# Patient Record
Sex: MALE | Race: WHITE | NOT HISPANIC OR LATINO | Employment: FULL TIME | URBAN - METROPOLITAN AREA
[De-identification: names, ages, dates, MRNs, and addresses within clinical notes are randomized per-mention and may not be internally consistent; named-entity substitution may affect disease eponyms.]

---

## 2017-06-23 ENCOUNTER — APPOINTMENT (OUTPATIENT)
Dept: ULTRASOUND IMAGING | Facility: HOSPITAL | Age: 42
End: 2017-06-23

## 2017-06-23 ENCOUNTER — HOSPITAL ENCOUNTER (EMERGENCY)
Facility: HOSPITAL | Age: 42
Discharge: HOME | End: 2017-06-23
Attending: EMERGENCY MEDICINE | Admitting: EMERGENCY MEDICINE

## 2017-06-23 VITALS
HEIGHT: 72 IN | WEIGHT: 245 LBS | OXYGEN SATURATION: 98 % | HEART RATE: 65 BPM | RESPIRATION RATE: 16 BRPM | SYSTOLIC BLOOD PRESSURE: 160 MMHG | DIASTOLIC BLOOD PRESSURE: 100 MMHG | TEMPERATURE: 96.5 F | BODY MASS INDEX: 33.18 KG/M2

## 2017-06-23 DIAGNOSIS — K80.20 CALCULUS OF GALLBLADDER WITHOUT CHOLECYSTITIS WITHOUT OBSTRUCTION: Primary | ICD-10-CM

## 2017-06-23 LAB
ALBUMIN SERPL-MCNC: 4.3 G/DL (ref 3.5–5.2)
ALBUMIN/GLOB SERPL: 1.4 G/DL
ALP SERPL-CCNC: 65 U/L (ref 39–117)
ALT SERPL W P-5'-P-CCNC: 38 U/L (ref 1–41)
ANION GAP SERPL CALCULATED.3IONS-SCNC: 14.1 MMOL/L
AST SERPL-CCNC: 43 U/L (ref 1–40)
BACTERIA UR QL AUTO: ABNORMAL /HPF
BASOPHILS # BLD AUTO: 0.02 10*3/MM3 (ref 0–0.2)
BASOPHILS NFR BLD AUTO: 0.2 % (ref 0–1.5)
BILIRUB SERPL-MCNC: 0.5 MG/DL (ref 0.1–1.2)
BILIRUB UR QL STRIP: NEGATIVE
BUN BLD-MCNC: 13 MG/DL (ref 6–20)
BUN/CREAT SERPL: 14.4 (ref 7–25)
CALCIUM SPEC-SCNC: 9.3 MG/DL (ref 8.6–10.5)
CHLORIDE SERPL-SCNC: 105 MMOL/L (ref 98–107)
CLARITY UR: CLEAR
CO2 SERPL-SCNC: 22.9 MMOL/L (ref 22–29)
COLOR UR: YELLOW
CREAT BLD-MCNC: 0.9 MG/DL (ref 0.76–1.27)
DEPRECATED RDW RBC AUTO: 41.7 FL (ref 37–54)
EOSINOPHIL # BLD AUTO: 0.13 10*3/MM3 (ref 0–0.7)
EOSINOPHIL NFR BLD AUTO: 1.1 % (ref 0.3–6.2)
ERYTHROCYTE [DISTWIDTH] IN BLOOD BY AUTOMATED COUNT: 13.6 % (ref 11.5–14.5)
GFR SERPL CREATININE-BSD FRML MDRD: 93 ML/MIN/1.73
GLOBULIN UR ELPH-MCNC: 3 GM/DL
GLUCOSE BLD-MCNC: 119 MG/DL (ref 65–99)
GLUCOSE UR STRIP-MCNC: NEGATIVE MG/DL
HCT VFR BLD AUTO: 45 % (ref 40.4–52.2)
HGB BLD-MCNC: 15.8 G/DL (ref 13.7–17.6)
HGB UR QL STRIP.AUTO: ABNORMAL
HYALINE CASTS UR QL AUTO: ABNORMAL /LPF
IMM GRANULOCYTES # BLD: 0.02 10*3/MM3 (ref 0–0.03)
IMM GRANULOCYTES NFR BLD: 0.2 % (ref 0–0.5)
KETONES UR QL STRIP: NEGATIVE
LEUKOCYTE ESTERASE UR QL STRIP.AUTO: NEGATIVE
LIPASE SERPL-CCNC: 27 U/L (ref 13–60)
LYMPHOCYTES # BLD AUTO: 4.32 10*3/MM3 (ref 0.9–4.8)
LYMPHOCYTES NFR BLD AUTO: 37.7 % (ref 19.6–45.3)
MCH RBC QN AUTO: 29.8 PG (ref 27–32.7)
MCHC RBC AUTO-ENTMCNC: 35.1 G/DL (ref 32.6–36.4)
MCV RBC AUTO: 84.9 FL (ref 79.8–96.2)
MONOCYTES # BLD AUTO: 0.62 10*3/MM3 (ref 0.2–1.2)
MONOCYTES NFR BLD AUTO: 5.4 % (ref 5–12)
NEUTROPHILS # BLD AUTO: 6.34 10*3/MM3 (ref 1.9–8.1)
NEUTROPHILS NFR BLD AUTO: 55.4 % (ref 42.7–76)
NITRITE UR QL STRIP: NEGATIVE
PH UR STRIP.AUTO: 6 [PH] (ref 5–8)
PLATELET # BLD AUTO: 187 10*3/MM3 (ref 140–500)
PMV BLD AUTO: 10.6 FL (ref 6–12)
POTASSIUM BLD-SCNC: 3.6 MMOL/L (ref 3.5–5.2)
PROT SERPL-MCNC: 7.3 G/DL (ref 6–8.5)
PROT UR QL STRIP: NEGATIVE
RBC # BLD AUTO: 5.3 10*6/MM3 (ref 4.6–6)
RBC # UR: ABNORMAL /HPF
REF LAB TEST METHOD: ABNORMAL
SODIUM BLD-SCNC: 142 MMOL/L (ref 136–145)
SP GR UR STRIP: 1.02 (ref 1–1.03)
SQUAMOUS #/AREA URNS HPF: ABNORMAL /HPF
UROBILINOGEN UR QL STRIP: ABNORMAL
WBC NRBC COR # BLD: 11.45 10*3/MM3 (ref 4.5–10.7)
WBC UR QL AUTO: ABNORMAL /HPF

## 2017-06-23 PROCEDURE — 99284 EMERGENCY DEPT VISIT MOD MDM: CPT

## 2017-06-23 PROCEDURE — 80053 COMPREHEN METABOLIC PANEL: CPT | Performed by: EMERGENCY MEDICINE

## 2017-06-23 PROCEDURE — 96375 TX/PRO/DX INJ NEW DRUG ADDON: CPT

## 2017-06-23 PROCEDURE — 25010000002 ONDANSETRON PER 1 MG: Performed by: EMERGENCY MEDICINE

## 2017-06-23 PROCEDURE — 96374 THER/PROPH/DIAG INJ IV PUSH: CPT

## 2017-06-23 PROCEDURE — 85025 COMPLETE CBC W/AUTO DIFF WBC: CPT | Performed by: EMERGENCY MEDICINE

## 2017-06-23 PROCEDURE — 81001 URINALYSIS AUTO W/SCOPE: CPT | Performed by: EMERGENCY MEDICINE

## 2017-06-23 PROCEDURE — 76705 ECHO EXAM OF ABDOMEN: CPT

## 2017-06-23 PROCEDURE — 83690 ASSAY OF LIPASE: CPT | Performed by: EMERGENCY MEDICINE

## 2017-06-23 PROCEDURE — 96361 HYDRATE IV INFUSION ADD-ON: CPT

## 2017-06-23 PROCEDURE — 25010000002 MORPHINE PER 10 MG: Performed by: EMERGENCY MEDICINE

## 2017-06-23 RX ORDER — ONDANSETRON 2 MG/ML
4 INJECTION INTRAMUSCULAR; INTRAVENOUS ONCE
Status: COMPLETED | OUTPATIENT
Start: 2017-06-23 | End: 2017-06-23

## 2017-06-23 RX ORDER — ONDANSETRON 8 MG/1
8 TABLET, ORALLY DISINTEGRATING ORAL EVERY 8 HOURS PRN
Qty: 12 TABLET | Refills: 0 | Status: SHIPPED | OUTPATIENT
Start: 2017-06-23 | End: 2018-02-15

## 2017-06-23 RX ORDER — SODIUM CHLORIDE 0.9 % (FLUSH) 0.9 %
10 SYRINGE (ML) INJECTION AS NEEDED
Status: DISCONTINUED | OUTPATIENT
Start: 2017-06-23 | End: 2017-06-23 | Stop reason: HOSPADM

## 2017-06-23 RX ORDER — HYDROCODONE BITARTRATE AND ACETAMINOPHEN 5; 325 MG/1; MG/1
1 TABLET ORAL EVERY 6 HOURS PRN
Qty: 20 TABLET | Refills: 0 | Status: SHIPPED | OUTPATIENT
Start: 2017-06-23 | End: 2018-02-15

## 2017-06-23 RX ORDER — PANTOPRAZOLE SODIUM 40 MG/10ML
80 INJECTION, POWDER, LYOPHILIZED, FOR SOLUTION INTRAVENOUS ONCE
Status: COMPLETED | OUTPATIENT
Start: 2017-06-23 | End: 2017-06-23

## 2017-06-23 RX ADMIN — ONDANSETRON 4 MG: 2 INJECTION INTRAMUSCULAR; INTRAVENOUS at 03:25

## 2017-06-23 RX ADMIN — MORPHINE SULFATE 4 MG: 4 INJECTION, SOLUTION INTRAMUSCULAR; INTRAVENOUS at 03:25

## 2017-06-23 RX ADMIN — SODIUM CHLORIDE 1000 ML: 9 INJECTION, SOLUTION INTRAVENOUS at 03:25

## 2017-06-23 RX ADMIN — PANTOPRAZOLE SODIUM 80 MG: 40 INJECTION, POWDER, FOR SOLUTION INTRAVENOUS at 03:25

## 2017-06-23 NOTE — ED NOTES
C/o upper abd pain off and on since Sunday.  Pain severe x 1 hour.  + nausea     Alanis Angel RN  06/23/17 2087

## 2017-06-23 NOTE — ED NOTES
Pt says that when he eats the pain worsens. Pt complains of midline upper abd pain. Pt denies N/V       Chavez De La Cruz RN  06/23/17 9711

## 2017-06-23 NOTE — ED PROVIDER NOTES
EMERGENCY DEPARTMENT ENCOUNTER    CHIEF COMPLAINT  Chief Complaint: abd pain  History given by: pt  History limited by: none   Room Number: 18/18  PMD: Burt Carrero MD      HPI:  Pt is a 42 y.o. male who presents complaining of waxing and waning mid abd pain that the pt states radiates to his chest and neck for the past 4 days. Pt states that his pain is worse after eating. Pt states that he has taken proton inhibitors sporadically without relief. Pt also c/o nausea. Pt denies vomiting. Pt denies a Hx abd surgeries.     Duration:  4 days   Onset: gradual  Timing: waxing and waning   Location: mid abd   Radiation: chest and neck  Quality: pain  Intensity/Severity: moderate   Progression: waxing and waning   Associated Symptoms: nausea  Aggravating Factors: eating  Alleviating Factors: none stated   Previous Episodes: Hx GERD and abd pain  Treatment before arrival: single dose of proton inhibitors    PAST MEDICAL HISTORY  Active Ambulatory Problems     Diagnosis Date Noted   • Gastroesophageal reflux disease 10/24/2016   • Seasonal allergic rhinitis 10/24/2016   • Obesity (BMI 30-39.9) 10/24/2016   • Dysphagia 10/24/2016     Resolved Ambulatory Problems     Diagnosis Date Noted   • No Resolved Ambulatory Problems     Past Medical History:   Diagnosis Date   • GERD (gastroesophageal reflux disease)        PAST SURGICAL HISTORY  Past Surgical History:   Procedure Laterality Date   • KNEE ACL RECONSTRUCTION Right 2010   • KNEE ACL RECONSTRUCTION         FAMILY HISTORY  Family History   Problem Relation Age of Onset   • Lung cancer Father 69   • Hypertension Mother    • Diabetes Paternal Grandmother        SOCIAL HISTORY  Social History     Social History   • Marital status:      Spouse name: Miriam   • Number of children: 3   • Years of education: N/A     Occupational History   • 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    REVIEW OF SYSTEMS  Review of Systems   Constitutional: Negative for activity change, appetite change and fever.   HENT: Negative for congestion and sore throat.    Eyes: Negative.    Respiratory: Negative for cough and shortness of breath.    Cardiovascular: Negative for chest pain and leg swelling.   Gastrointestinal: Positive for abdominal pain (radiating to chest and neck) and nausea. Negative for diarrhea and vomiting.   Endocrine: Negative.    Genitourinary: Negative for decreased urine volume and dysuria.   Musculoskeletal: Negative for neck pain.   Skin: Negative for rash and wound.   Allergic/Immunologic: Negative.    Neurological: Negative for weakness, numbness and headaches.   Hematological: Negative.    Psychiatric/Behavioral: Negative.    All other systems reviewed and are negative.      PHYSICAL EXAM  ED Triage Vitals   Temp Heart Rate Resp BP SpO2   06/23/17 0254 06/23/17 0254 06/23/17 0254 -- 06/23/17 0254   96.5 °F (35.8 °C) 96 16  97 %      Temp src Heart Rate Source Patient Position BP Location FiO2 (%)   -- -- -- -- --              Physical Exam   Constitutional: He is oriented to person, place, and time and well-developed, well-nourished, and in no distress.   HENT:   Head: Normocephalic and atraumatic.   Eyes: EOM are normal. Pupils are equal, round, and reactive to light.   Neck: Normal range of motion. Neck supple.   Cardiovascular: Normal rate, regular rhythm and normal heart sounds.    Pulmonary/Chest: Effort normal and breath sounds normal. No respiratory distress.   Abdominal: Soft. There is tenderness in the epigastric area. There is no rebound and no guarding.   Musculoskeletal: Normal range of motion. He exhibits no edema.   Neurological: He is alert and  oriented to person, place, and time. He has normal sensation and normal strength.   Skin: Skin is warm and dry.   Psychiatric: Mood and affect normal.   Nursing note and vitals reviewed.      LAB RESULTS  Lab Results (last 24 hours)     Procedure Component Value Units Date/Time    Urinalysis With / Culture If Indicated [[id number]]  (Abnormal) Collected:  06/23/17 0308    Specimen:  Urine from Urine, Clean Catch Updated:  06/23/17 0329     Color, UA Yellow     Appearance, UA Clear     pH, UA 6.0     Specific Gravity, UA 1.023     Glucose, UA Negative     Ketones, UA Negative     Bilirubin, UA Negative     Blood, UA Small (1+) (A)     Protein, UA Negative     Leuk Esterase, UA Negative     Nitrite, UA Negative     Urobilinogen, UA 1.0 E.U./dL    Urinalysis, Microscopic Only [[id number]]  (Abnormal) Collected:  06/23/17 0308    Specimen:  Urine from Urine, Clean Catch Updated:  06/23/17 0329     RBC, UA 3-5 (A) /HPF      WBC, UA 3-5 (A) /HPF      Bacteria, UA None Seen /HPF      Squamous Epithelial Cells, UA 0-2 /HPF      Hyaline Casts, UA 3-6 /LPF      Methodology Automated Microscopy    CBC & Differential [[id number]] Collected:  06/23/17 0313    Specimen:  Blood Updated:  06/23/17 0323    Narrative:       The following orders were created for panel order CBC & Differential.  Procedure                               Abnormality         Status                     ---------                               -----------         ------                     CBC Auto Differential[[id number]]         Abnormal            Final result                 Please view results for these tests on the individual orders.    Comprehensive Metabolic Panel [[id number]]  (Abnormal) Collected:  06/23/17 0313    Specimen:  Blood Updated:  06/23/17 0346     Glucose 119 (H) mg/dL      BUN 13 mg/dL      Creatinine 0.90 mg/dL      Sodium 142 mmol/L      Potassium 3.6 mmol/L      Chloride 105 mmol/L      CO2 22.9 mmol/L      Calcium 9.3 mg/dL      Total Protein  7.3 g/dL      Albumin 4.30 g/dL      ALT (SGPT) 38 U/L      AST (SGOT) 43 (H) U/L      Alkaline Phosphatase 65 U/L      Total Bilirubin 0.5 mg/dL      eGFR Non African Amer 93 mL/min/1.73      Globulin 3.0 gm/dL      A/G Ratio 1.4 g/dL      BUN/Creatinine Ratio 14.4     Anion Gap 14.1 mmol/L     Lipase [[id number]]  (Normal) Collected:  06/23/17 0313    Specimen:  Blood Updated:  06/23/17 0346     Lipase 27 U/L     CBC Auto Differential [[id number]]  (Abnormal) Collected:  06/23/17 0313    Specimen:  Blood Updated:  06/23/17 0323     WBC 11.45 (H) 10*3/mm3      RBC 5.30 10*6/mm3      Hemoglobin 15.8 g/dL      Hematocrit 45.0 %      MCV 84.9 fL      MCH 29.8 pg      MCHC 35.1 g/dL      RDW 13.6 %      RDW-SD 41.7 fl      MPV 10.6 fL      Platelets 187 10*3/mm3      Neutrophil % 55.4 %      Lymphocyte % 37.7 %      Monocyte % 5.4 %      Eosinophil % 1.1 %      Basophil % 0.2 %      Immature Grans % 0.2 %      Neutrophils, Absolute 6.34 10*3/mm3      Lymphocytes, Absolute 4.32 10*3/mm3      Monocytes, Absolute 0.62 10*3/mm3      Eosinophils, Absolute 0.13 10*3/mm3      Basophils, Absolute 0.02 10*3/mm3      Immature Grans, Absolute 0.02 10*3/mm3           I ordered the above labs and reviewed the results    RADIOLOGY  US Gallbladder   Final Result   1. Cholelithiasis.           This report was finalized on 6/23/2017 3:51 AM by Shahzad Soto MD.           US gallbladder: multiple gall stone w/o pericholecystic fluid or gallbladder wall thickening    I ordered the above noted radiological studies. Interpreted by radiologist. Reviewed by me in PACS.       PROCEDURES  Procedures      PROGRESS AND CONSULTS  ED Course   0255  Ordered labs for further evaluation.   0313  Ordered morphine, zofran, and protonix for pain and nausea, and a US gallbladder for further evaluation.   0344  Received a call from the US tech, who described the pt's US gallbladder with findings of multiple gall stone w/o pericholecystic fluid or gallbladder  wall thickening   0414  Rechecked pt, who is resting comfortably, in no distress, and without focal neurologic deficit. Discussed findings of gallstones, and plan to d/c the pt with meds for pain and nausea, advisement to avoid fatty foods, and a referral to a surgeon for gallbladder removal. Pt understands and agrees with the plan, and all questions were answered.       MEDICAL DECISION MAKING  Results were reviewed/discussed with the patient and they were also made aware of online access. Pt also made aware that some labs, such as cultures, will not be resulted during ER visit and follow up with PMD is necessary.     MDM  Number of Diagnoses or Management Options     Amount and/or Complexity of Data Reviewed  Clinical lab tests: ordered and reviewed (WBC 11.45, Blood U 1+)  Tests in the radiology section of CPT®: ordered and reviewed (US gallbladder: multiple gall stone w/o pericholecystic fluid or gallbladder wall thickening)    Patient Progress  Patient progress: stable         DIAGNOSIS  Final diagnoses:   Calculus of gallbladder without cholecystitis without obstruction       DISPOSITION  DISCHARGE    Patient discharged in stable condition.    Reviewed implications of results, diagnosis, meds, responsibility to follow up, warning signs and symptoms of possible worsening, potential complications and reasons to return to ER.    Patient/Family voiced understanding of above instructions.    Discussed plan for discharge, as there is no emergent indication for admission.  Pt/family is agreeable and understands need for follow up and repeat testing.  Pt is aware that discharge does not mean that nothing is wrong but it indicates no emergency is present that requires admission and they must continue care with follow-up as given below or physician of their choice.     FOLLOW-UP  Burt Carrero MD  [address] [address]  [phone]    Schedule an appointment as soon as possible for a  visit      Mayur Nettles MD  [address]  [address] [address]  [phone]    Schedule an appointment as soon as possible for a visit           Medication List      New Prescriptions          HYDROcodone-acetaminophen 5-325 MG per tablet   Commonly known as:  NORCO   Take 1 tablet by mouth Every 6 (Six) Hours As Needed for Moderate Pain   (4-6).       ondansetron ODT 8 MG disintegrating tablet   Commonly known as:  ZOFRAN ODT   Take 1 tablet by mouth Every 8 (Eight) Hours As Needed for Nausea or   Vomiting.         Stop          fexofenadine 30 MG tablet   Commonly known as:  ALLEGRA               Latest Documented Vital Signs:  As of 6:56 AM  BP- 160/100 HR- 65 Temp- 96.5 °F (35.8 °C) O2 sat- 98%    --  Documentation assistance provided by balwinder Hinkle for Dr. Bowman.  Information recorded by the miguel angelibsarath was done at my direction and has been verified and validated by me.     Elio Hinkle  06/23/17 0508       Bashir Bowman MD  06/23/17 0639

## 2017-06-26 ENCOUNTER — TELEPHONE (OUTPATIENT)
Dept: SOCIAL WORK | Facility: HOSPITAL | Age: 42
End: 2017-06-26

## 2018-02-15 ENCOUNTER — OFFICE VISIT (OUTPATIENT)
Dept: INTERNAL MEDICINE | Facility: CLINIC | Age: 43
End: 2018-02-15

## 2018-02-15 VITALS
HEIGHT: 72 IN | TEMPERATURE: 97 F | DIASTOLIC BLOOD PRESSURE: 82 MMHG | HEART RATE: 65 BPM | SYSTOLIC BLOOD PRESSURE: 116 MMHG | OXYGEN SATURATION: 94 % | WEIGHT: 263.5 LBS | BODY MASS INDEX: 35.69 KG/M2

## 2018-02-15 DIAGNOSIS — R13.19 ESOPHAGEAL DYSPHAGIA: Primary | ICD-10-CM

## 2018-02-15 DIAGNOSIS — E66.9 OBESITY (BMI 30-39.9): Chronic | ICD-10-CM

## 2018-02-15 DIAGNOSIS — K21.9 GASTROESOPHAGEAL REFLUX DISEASE, ESOPHAGITIS PRESENCE NOT SPECIFIED: Chronic | ICD-10-CM

## 2018-02-15 DIAGNOSIS — H02.9 EYELID LESION: ICD-10-CM

## 2018-02-15 DIAGNOSIS — R31.21 ASYMPTOMATIC MICROSCOPIC HEMATURIA: ICD-10-CM

## 2018-02-15 DIAGNOSIS — R73.9 HYPERGLYCEMIA: ICD-10-CM

## 2018-02-15 DIAGNOSIS — K80.20 CALCULUS OF GALLBLADDER WITHOUT CHOLECYSTITIS WITHOUT OBSTRUCTION: ICD-10-CM

## 2018-02-15 LAB
ALBUMIN SERPL-MCNC: 4.1 G/DL (ref 3.5–5.2)
ALBUMIN/GLOB SERPL: 1.7 G/DL
ALP SERPL-CCNC: 60 U/L (ref 39–117)
ALT SERPL-CCNC: 34 U/L (ref 1–41)
AST SERPL-CCNC: 23 U/L (ref 1–40)
BASOPHILS # BLD AUTO: 0.02 10*3/MM3 (ref 0–0.2)
BASOPHILS NFR BLD AUTO: 0.3 % (ref 0–1.5)
BILIRUB BLD-MCNC: NEGATIVE MG/DL
BILIRUB SERPL-MCNC: 0.3 MG/DL (ref 0.1–1.2)
BUN SERPL-MCNC: 15 MG/DL (ref 6–20)
BUN/CREAT SERPL: 16.5 (ref 7–25)
CALCIUM SERPL-MCNC: 9.4 MG/DL (ref 8.6–10.5)
CHLORIDE SERPL-SCNC: 104 MMOL/L (ref 98–107)
CLARITY, POC: CLEAR
CO2 SERPL-SCNC: 25.5 MMOL/L (ref 22–29)
COLOR UR: YELLOW
CREAT SERPL-MCNC: 0.91 MG/DL (ref 0.76–1.27)
EOSINOPHIL # BLD AUTO: 0.16 10*3/MM3 (ref 0–0.7)
EOSINOPHIL NFR BLD AUTO: 2 % (ref 0.3–6.2)
ERYTHROCYTE [DISTWIDTH] IN BLOOD BY AUTOMATED COUNT: 13.8 % (ref 11.5–14.5)
GFR SERPLBLD CREATININE-BSD FMLA CKD-EPI: 111 ML/MIN/1.73
GFR SERPLBLD CREATININE-BSD FMLA CKD-EPI: 92 ML/MIN/1.73
GLOBULIN SER CALC-MCNC: 2.4 GM/DL
GLUCOSE SERPL-MCNC: 94 MG/DL (ref 65–99)
GLUCOSE UR STRIP-MCNC: NEGATIVE MG/DL
HBA1C MFR BLD: 5.51 % (ref 4.8–5.6)
HCT VFR BLD AUTO: 43.9 % (ref 40.4–52.2)
HGB BLD-MCNC: 14.7 G/DL (ref 13.7–17.6)
IMM GRANULOCYTES # BLD: 0 10*3/MM3 (ref 0–0.03)
IMM GRANULOCYTES NFR BLD: 0 % (ref 0–0.5)
KETONES UR QL: NEGATIVE
LEUKOCYTE EST, POC: NEGATIVE
LYMPHOCYTES # BLD AUTO: 3 10*3/MM3 (ref 0.9–4.8)
LYMPHOCYTES NFR BLD AUTO: 37.9 % (ref 19.6–45.3)
MCH RBC QN AUTO: 28.8 PG (ref 27–32.7)
MCHC RBC AUTO-ENTMCNC: 33.5 G/DL (ref 32.6–36.4)
MCV RBC AUTO: 85.9 FL (ref 79.8–96.2)
MONOCYTES # BLD AUTO: 0.44 10*3/MM3 (ref 0.2–1.2)
MONOCYTES NFR BLD AUTO: 5.6 % (ref 5–12)
NEUTROPHILS # BLD AUTO: 4.29 10*3/MM3 (ref 1.9–8.1)
NEUTROPHILS NFR BLD AUTO: 54.2 % (ref 42.7–76)
NITRITE UR-MCNC: NEGATIVE MG/ML
PH UR: 6 [PH] (ref 5–8)
PLATELET # BLD AUTO: 192 10*3/MM3 (ref 140–500)
POTASSIUM SERPL-SCNC: 3.8 MMOL/L (ref 3.5–5.2)
PROT SERPL-MCNC: 6.5 G/DL (ref 6–8.5)
PROT UR STRIP-MCNC: NEGATIVE MG/DL
RBC # BLD AUTO: 5.11 10*6/MM3 (ref 4.6–6)
RBC # UR STRIP: ABNORMAL /UL
SODIUM SERPL-SCNC: 141 MMOL/L (ref 136–145)
SP GR UR: 1.03 (ref 1–1.03)
UROBILINOGEN UR QL: NORMAL
WBC # BLD AUTO: 7.91 10*3/MM3 (ref 4.5–10.7)

## 2018-02-15 PROCEDURE — 99215 OFFICE O/P EST HI 40 MIN: CPT | Performed by: FAMILY MEDICINE

## 2018-02-15 PROCEDURE — 81003 URINALYSIS AUTO W/O SCOPE: CPT | Performed by: FAMILY MEDICINE

## 2018-02-15 RX ORDER — FAMOTIDINE 40 MG/1
40 TABLET, FILM COATED ORAL DAILY
Qty: 30 TABLET | Refills: 3 | Status: SHIPPED | OUTPATIENT
Start: 2018-02-15 | End: 2018-08-09

## 2018-02-15 NOTE — PROGRESS NOTES
Lester Benoit is a 42 y.o. male.      Assessment/Plan   Problem List Items Addressed This Visit        Digestive    Gastroesophageal reflux disease (Chronic)    Relevant Medications    famotidine (PEPCID) 40 MG tablet    Other Relevant Orders    Comprehensive Metabolic Panel    Ambulatory Referral to Gastroenterology    Obesity (BMI 30-39.9) (Chronic)    Relevant Orders    Comprehensive Metabolic Panel    Dysphagia - Primary    Relevant Orders    CBC & Differential    Comprehensive Metabolic Panel    Ambulatory Referral to Gastroenterology    Calculus of gallbladder without biliary obstruction       Genitourinary    Asymptomatic microscopic hematuria    Relevant Orders    POC Urinalysis Dipstick, Automated       Other    Eyelid lesion    Relevant Orders    Ambulatory Referral to General Surgery    Hyperglycemia    Relevant Orders    Hemoglobin A1c         patient will initiate famotidine for his reflux symptoms and then subsequently have GI consultation for reflux as well as dysphagia he's been seen by ophthalmology for eyelid lesion that seems to be growing he's been trying to control his weight with calorie restriction diet.  Will follow-up results of blood work  Repeat microscopic hematuria after adequate hydration and recheck UA  40 minutes spent with patient with greater than 50% of the time face-to-face coordinating care and counseling chronic problems    Chief Complaint   Patient presents with   • transferring from Dr. Carrero   • occasional abdominal pain   • issue with skin close to right tear duct     Social History   Substance Use Topics   • Smoking status: Never Smoker   • Smokeless tobacco: Never Used   • Alcohol use Yes      Comment: socially       History of Present Illness   Patient new to this office with recently developed medical problems of cholelithiasis GERD dysphasia and eyelid lesion and elevated sugars he's concerned because the eyelid lesion has been gradually growing and seems to obstruct his  "vision is slightly painful he feels that there is more tears, now that I because the location of the lesion near tear duct.  He's been found to have cholelithiasis on ultrasound of his gallbladder last summer he is slightly elevated liver enzymes and has intermittent abdominal pain he also has had increasing episodes of difficulty swallowing feels that it is in the middle of his chest it is gradually becoming more frequent he's been using some anti-reflux medicine intermittently with success minutes.  Weight is no  vomiting or constipation or diarrhea.    The following portions of the patient's history were reviewed and updated as appropriate:PMHroutine: Social history , Past Medical History, Surgical history , Allergies, Current Medications, Active Problem List, Family History and Health Maintenance    Review of Systems   Constitutional: Negative for appetite change, fever and unexpected weight change.   HENT: Negative for ear pain, facial swelling and sore throat.    Eyes: Negative for pain and visual disturbance.   Respiratory: Negative for chest tightness, shortness of breath and wheezing.    Cardiovascular: Negative for chest pain and palpitations.   Gastrointestinal: Positive for abdominal pain. Negative for blood in stool, constipation, diarrhea, nausea and vomiting.   Endocrine: Negative.    Genitourinary: Negative for difficulty urinating and hematuria.   Musculoskeletal: Negative for joint swelling.   Neurological: Negative for tremors, seizures and syncope.   Hematological: Negative for adenopathy.   Psychiatric/Behavioral: Negative.        Objective   Vitals:    02/15/18 0920   BP: 116/82   BP Location: Left arm   Patient Position: Sitting   Cuff Size: Large Adult   Pulse: 65   Temp: 97 °F (36.1 °C)   TempSrc: Oral   SpO2: 94%   Weight: 120 kg (263 lb 8 oz)   Height: 182.9 cm (72\")     Body mass index is 35.74 kg/(m^2).  Physical Exam   Constitutional: He is oriented to person, place, and time. He " appears well-developed and well-nourished. No distress.   HENT:   Head: Normocephalic and atraumatic.   Eyes: Conjunctivae and EOM are normal. Pupils are equal, round, and reactive to light. Right eye exhibits no discharge. Left eye exhibits no discharge. No scleral icterus.   Neck: Normal range of motion. Neck supple. No tracheal deviation present. No thyromegaly present.   Cardiovascular: Normal rate, regular rhythm, normal heart sounds, intact distal pulses and normal pulses.  Exam reveals no gallop.    No murmur heard.  Pulmonary/Chest: Effort normal and breath sounds normal. No respiratory distress. He has no wheezes. He has no rales.   Abdominal: Soft. Bowel sounds are normal. He exhibits no distension. There is no tenderness.   Musculoskeletal: Normal range of motion.   Neurological: He is alert and oriented to person, place, and time. He exhibits normal muscle tone. Coordination normal.   Skin: Skin is warm. No rash noted. No erythema. No pallor.   Psychiatric: He has a normal mood and affect. His behavior is normal. Judgment and thought content normal.   Nursing note and vitals reviewed.    Reviewed Data:  No visits with results within 1 Month(s) from this visit.  Latest known visit with results is:    Admission on 06/23/2017, Discharged on 06/23/2017   Component Date Value Ref Range Status   • Glucose 06/23/2017 119* 65 - 99 mg/dL Final   • BUN 06/23/2017 13  6 - 20 mg/dL Final   • Creatinine 06/23/2017 0.90  0.76 - 1.27 mg/dL Final   • Sodium 06/23/2017 142  136 - 145 mmol/L Final   • Potassium 06/23/2017 3.6  3.5 - 5.2 mmol/L Final   • Chloride 06/23/2017 105  98 - 107 mmol/L Final   • CO2 06/23/2017 22.9  22.0 - 29.0 mmol/L Final   • Calcium 06/23/2017 9.3  8.6 - 10.5 mg/dL Final   • Total Protein 06/23/2017 7.3  6.0 - 8.5 g/dL Final   • Albumin 06/23/2017 4.30  3.50 - 5.20 g/dL Final   • ALT (SGPT) 06/23/2017 38  1 - 41 U/L Final   • AST (SGOT) 06/23/2017 43* 1 - 40 U/L Final   • Alkaline Phosphatase  06/23/2017 65  39 - 117 U/L Final   • Total Bilirubin 06/23/2017 0.5  0.1 - 1.2 mg/dL Final   • eGFR Non  Amer 06/23/2017 93  >60 mL/min/1.73 Final   • Globulin 06/23/2017 3.0  gm/dL Final   • A/G Ratio 06/23/2017 1.4  g/dL Final   • BUN/Creatinine Ratio 06/23/2017 14.4  7.0 - 25.0 Final   • Anion Gap 06/23/2017 14.1  mmol/L Final   • Color, UA 06/23/2017 Yellow  Yellow, Straw Final   • Appearance, UA 06/23/2017 Clear  Clear Final   • pH, UA 06/23/2017 6.0  5.0 - 8.0 Final   • Specific Gravity, UA 06/23/2017 1.023  1.005 - 1.030 Final   • Glucose, UA 06/23/2017 Negative  Negative Final   • Ketones, UA 06/23/2017 Negative  Negative Final   • Bilirubin, UA 06/23/2017 Negative  Negative Final   • Blood, UA 06/23/2017 Small (1+)* Negative Final   • Protein, UA 06/23/2017 Negative  Negative Final   • Leuk Esterase, UA 06/23/2017 Negative  Negative Final   • Nitrite, UA 06/23/2017 Negative  Negative Final   • Urobilinogen, UA 06/23/2017 1.0 E.U./dL  0.2 - 1.0 E.U./dL Final   • Lipase 06/23/2017 27  13 - 60 U/L Final   • WBC 06/23/2017 11.45* 4.50 - 10.70 10*3/mm3 Final   • RBC 06/23/2017 5.30  4.60 - 6.00 10*6/mm3 Final   • Hemoglobin 06/23/2017 15.8  13.7 - 17.6 g/dL Final   • Hematocrit 06/23/2017 45.0  40.4 - 52.2 % Final   • MCV 06/23/2017 84.9  79.8 - 96.2 fL Final   • MCH 06/23/2017 29.8  27.0 - 32.7 pg Final   • MCHC 06/23/2017 35.1  32.6 - 36.4 g/dL Final   • RDW 06/23/2017 13.6  11.5 - 14.5 % Final   • RDW-SD 06/23/2017 41.7  37.0 - 54.0 fl Final   • MPV 06/23/2017 10.6  6.0 - 12.0 fL Final   • Platelets 06/23/2017 187  140 - 500 10*3/mm3 Final   • Neutrophil % 06/23/2017 55.4  42.7 - 76.0 % Final   • Lymphocyte % 06/23/2017 37.7  19.6 - 45.3 % Final   • Monocyte % 06/23/2017 5.4  5.0 - 12.0 % Final   • Eosinophil % 06/23/2017 1.1  0.3 - 6.2 % Final   • Basophil % 06/23/2017 0.2  0.0 - 1.5 % Final   • Immature Grans % 06/23/2017 0.2  0.0 - 0.5 % Final   • Neutrophils, Absolute 06/23/2017 6.34  1.90 - 8.10  10*3/mm3 Final   • Lymphocytes, Absolute 06/23/2017 4.32  0.90 - 4.80 10*3/mm3 Final   • Monocytes, Absolute 06/23/2017 0.62  0.20 - 1.20 10*3/mm3 Final   • Eosinophils, Absolute 06/23/2017 0.13  0.00 - 0.70 10*3/mm3 Final   • Basophils, Absolute 06/23/2017 0.02  0.00 - 0.20 10*3/mm3 Final   • Immature Grans, Absolute 06/23/2017 0.02  0.00 - 0.03 10*3/mm3 Final   • RBC, UA 06/23/2017 3-5* None Seen, 0-2 /HPF Final   • WBC, UA 06/23/2017 3-5* None Seen, 0-2 /HPF Final   • Bacteria, UA 06/23/2017 None Seen  None Seen /HPF Final   • Squamous Epithelial Cells, UA 06/23/2017 0-2  None Seen, 0-2 /HPF Final   • Hyaline Casts, UA 06/23/2017 3-6  None Seen /LPF Final   • Methodology 06/23/2017 Automated Microscopy   Final

## 2018-02-16 DIAGNOSIS — R31.21 ASYMPTOMATIC MICROSCOPIC HEMATURIA: Primary | ICD-10-CM

## 2018-02-17 LAB
BACTERIA UR CULT: NO GROWTH
BACTERIA UR CULT: NORMAL

## 2018-02-19 ENCOUNTER — TELEPHONE (OUTPATIENT)
Dept: INTERNAL MEDICINE | Facility: CLINIC | Age: 43
End: 2018-02-19

## 2018-02-19 DIAGNOSIS — R31.21 ASYMPTOMATIC MICROSCOPIC HEMATURIA: Primary | ICD-10-CM

## 2018-03-01 ENCOUNTER — OFFICE VISIT (OUTPATIENT)
Dept: GASTROENTEROLOGY | Facility: CLINIC | Age: 43
End: 2018-03-01

## 2018-03-01 VITALS
WEIGHT: 263.4 LBS | DIASTOLIC BLOOD PRESSURE: 76 MMHG | BODY MASS INDEX: 34.91 KG/M2 | HEIGHT: 73 IN | SYSTOLIC BLOOD PRESSURE: 122 MMHG | TEMPERATURE: 97.8 F

## 2018-03-01 DIAGNOSIS — R13.14 PHARYNGOESOPHAGEAL DYSPHAGIA: ICD-10-CM

## 2018-03-01 DIAGNOSIS — K21.9 GASTROESOPHAGEAL REFLUX DISEASE, ESOPHAGITIS PRESENCE NOT SPECIFIED: Primary | ICD-10-CM

## 2018-03-01 PROCEDURE — 99204 OFFICE O/P NEW MOD 45 MIN: CPT | Performed by: INTERNAL MEDICINE

## 2018-03-01 RX ORDER — SODIUM CHLORIDE, SODIUM LACTATE, POTASSIUM CHLORIDE, CALCIUM CHLORIDE 600; 310; 30; 20 MG/100ML; MG/100ML; MG/100ML; MG/100ML
30 INJECTION, SOLUTION INTRAVENOUS CONTINUOUS
Status: CANCELLED | OUTPATIENT
Start: 2018-04-06

## 2018-03-01 RX ORDER — PANTOPRAZOLE SODIUM 40 MG/1
40 TABLET, DELAYED RELEASE ORAL
Qty: 30 TABLET | Refills: 2 | Status: SHIPPED | OUTPATIENT
Start: 2018-03-01 | End: 2018-05-31 | Stop reason: SDUPTHER

## 2018-03-01 NOTE — PROGRESS NOTES
Chief Complaint   Patient presents with   • Heartburn   • Difficulty Swallowing       Subjective     HPI    Lester Benoit is a 42 y.o. male with a past medical history noted below who presents for evaluation of difficulty swallowing and GERD.  Symptoms have been intermittent for the past year.  Possible precipitant of weight gain.  Occurring with all foods but more noticeable with sandwiches and dry breads.  Foods hang up in the upper esophagus.  Will sit for a few seconds.  It causes some distress as he feels that he is choking.  Finally passes down.  He does try to chew breads thoroughly before trying to swallow.  No associated nausea, vomiting, or weight loss.      Does have lots of heartburn issues for the past year.  Feels substernal burning and reflux.  Worse with tomatoes, spicy food.     Sinuses with significant drainage, takes allegra.  Currently worse with nighttime drainage.    No family history of GI malignancies.  No smoking or excess ETOH.  No abdominal surgeries.  Works in IT at [employer].    Taking pepcid once per day but not noticing much improvement.      Past Medical History:   Diagnosis Date   • GERD (gastroesophageal reflux disease)          Current Outpatient Prescriptions:   •  famotidine (PEPCID) 40 MG tablet, Take 1 tablet by mouth Daily., Disp: 30 tablet, Rfl: 3  •  fexofenadine (ALLEGRA) 30 MG tablet, Take 30 mg by mouth Daily., Disp: , Rfl:   •  pantoprazole (PROTONIX) 40 MG EC tablet, Take 1 tablet by mouth Every Morning Before Breakfast., Disp: 30 tablet, Rfl: 2    No Known Allergies    Social History     Social History   • Marital status:      Spouse name: Miriam   • Number of children: 3   • Years of education: N/A     Occupational History   • [employer] ([employer])      Social History Main Topics   • Smoking status: Never Smoker   • Smokeless tobacco: Never Used   • Alcohol use Yes      Comment: socially   • Drug use: No   • Sexual activity: Yes     Other Topics Concern   • Not on file      Social History Narrative       Family History   Problem Relation Age of Onset   • Lung cancer Father 69   • Hypertension Mother    • Diabetes Paternal Grandmother        Review of Systems   Constitutional: Negative for activity change, appetite change and fatigue.   HENT: Positive for trouble swallowing. Negative for sore throat.    Respiratory: Negative.    Cardiovascular: Negative.    Gastrointestinal: Negative for abdominal distention, abdominal pain and blood in stool.        + GERD   Endocrine: Negative for cold intolerance and heat intolerance.   Genitourinary: Negative for difficulty urinating, dysuria and frequency.   Musculoskeletal: Negative for arthralgias, back pain and myalgias.   Skin: Negative.    Hematological: Negative for adenopathy. Does not bruise/bleed easily.   All other systems reviewed and are negative.      Objective     Vitals:    03/01/18 0858   BP: 122/76   Temp: 97.8 °F (36.6 °C)     Last 2 weights    03/01/18  0858   Weight: 119 kg (263 lb 6.4 oz)     Body mass index is 34.75 kg/(m^2).    Physical Exam   Constitutional: He is oriented to person, place, and time. He appears well-developed and well-nourished. No distress.   obese   HENT:   Head: Normocephalic and atraumatic.   Right Ear: External ear normal.   Left Ear: External ear normal.   Nose: Nose normal.   Mouth/Throat: Oropharynx is clear and moist.   Eyes: Conjunctivae and EOM are normal. Right eye exhibits no discharge. Left eye exhibits no discharge. No scleral icterus.   Neck: Normal range of motion. Neck supple. No thyromegaly present.   No supraclavicular adenopathy   Cardiovascular: Normal rate, regular rhythm, normal heart sounds and intact distal pulses.  Exam reveals no gallop.    No murmur heard.  No lower extremity edema   Pulmonary/Chest: Effort normal and breath sounds normal. No respiratory distress. He has no wheezes.   Abdominal: Soft. Normal appearance and bowel sounds are normal. He exhibits no distension  and no mass. There is no hepatosplenomegaly. There is no tenderness. There is no rigidity, no rebound and no guarding. No hernia.   Genitourinary:   Genitourinary Comments: Rectal exam deferred   Musculoskeletal: Normal range of motion. He exhibits no edema or tenderness.   No atrophy of upper or lower extremities.  Normal digits and nails of both hands.   Lymphadenopathy:     He has no cervical adenopathy.   Neurological: He is alert and oriented to person, place, and time. He displays no atrophy. Coordination normal.   Skin: Skin is warm and dry. No rash noted. He is not diaphoretic. No erythema.   Psychiatric: He has a normal mood and affect. His behavior is normal. Judgment and thought content normal.   Vitals reviewed.      WBC   Date Value Ref Range Status   02/15/2018 7.91 4.50 - 10.70 10*3/mm3 Final   06/23/2017 11.45 (H) 4.50 - 10.70 10*3/mm3 Final     RBC   Date Value Ref Range Status   02/15/2018 5.11 4.60 - 6.00 10*6/mm3 Final   06/23/2017 5.30 4.60 - 6.00 10*6/mm3 Final     Hemoglobin   Date Value Ref Range Status   02/15/2018 14.7 13.7 - 17.6 g/dL Final   06/23/2017 15.8 13.7 - 17.6 g/dL Final     Hematocrit   Date Value Ref Range Status   02/15/2018 43.9 40.4 - 52.2 % Final   06/23/2017 45.0 40.4 - 52.2 % Final     MCV   Date Value Ref Range Status   02/15/2018 85.9 79.8 - 96.2 fL Final   06/23/2017 84.9 79.8 - 96.2 fL Final     MCH   Date Value Ref Range Status   02/15/2018 28.8 27.0 - 32.7 pg Final   06/23/2017 29.8 27.0 - 32.7 pg Final     MCHC   Date Value Ref Range Status   02/15/2018 33.5 32.6 - 36.4 g/dL Final   06/23/2017 35.1 32.6 - 36.4 g/dL Final     RDW   Date Value Ref Range Status   02/15/2018 13.8 11.5 - 14.5 % Final   06/23/2017 13.6 11.5 - 14.5 % Final     RDW-SD   Date Value Ref Range Status   06/23/2017 41.7 37.0 - 54.0 fl Final     MPV   Date Value Ref Range Status   06/23/2017 10.6 6.0 - 12.0 fL Final     Platelets   Date Value Ref Range Status   02/15/2018 192 140 - 500 10*3/mm3  Final   06/23/2017 187 140 - 500 10*3/mm3 Final     Neutrophil %   Date Value Ref Range Status   06/23/2017 55.4 42.7 - 76.0 % Final     Neutrophil Rel %   Date Value Ref Range Status   02/15/2018 54.2 42.7 - 76.0 % Final     Lymphocyte %   Date Value Ref Range Status   06/23/2017 37.7 19.6 - 45.3 % Final     Lymphocyte Rel %   Date Value Ref Range Status   02/15/2018 37.9 19.6 - 45.3 % Final     Monocyte %   Date Value Ref Range Status   06/23/2017 5.4 5.0 - 12.0 % Final     Monocyte Rel %   Date Value Ref Range Status   02/15/2018 5.6 5.0 - 12.0 % Final     Eosinophil %   Date Value Ref Range Status   06/23/2017 1.1 0.3 - 6.2 % Final     Eosinophil Rel %   Date Value Ref Range Status   02/15/2018 2.0 0.3 - 6.2 % Final     Basophil %   Date Value Ref Range Status   06/23/2017 0.2 0.0 - 1.5 % Final     Basophil Rel %   Date Value Ref Range Status   02/15/2018 0.3 0.0 - 1.5 % Final     Immature Grans %   Date Value Ref Range Status   06/23/2017 0.2 0.0 - 0.5 % Final     Neutrophils, Absolute   Date Value Ref Range Status   06/23/2017 6.34 1.90 - 8.10 10*3/mm3 Final     Neutrophils Absolute   Date Value Ref Range Status   02/15/2018 4.29 1.90 - 8.10 10*3/mm3 Final     Lymphocytes, Absolute   Date Value Ref Range Status   06/23/2017 4.32 0.90 - 4.80 10*3/mm3 Final     Lymphocytes Absolute   Date Value Ref Range Status   02/15/2018 3.00 0.90 - 4.80 10*3/mm3 Final     Monocytes, Absolute   Date Value Ref Range Status   06/23/2017 0.62 0.20 - 1.20 10*3/mm3 Final     Monocytes Absolute   Date Value Ref Range Status   02/15/2018 0.44 0.20 - 1.20 10*3/mm3 Final     Eosinophils, Absolute   Date Value Ref Range Status   06/23/2017 0.13 0.00 - 0.70 10*3/mm3 Final     Eosinophils Absolute   Date Value Ref Range Status   02/15/2018 0.16 0.00 - 0.70 10*3/mm3 Final     Basophils, Absolute   Date Value Ref Range Status   06/23/2017 0.02 0.00 - 0.20 10*3/mm3 Final     Basophils Absolute   Date Value Ref Range Status   02/15/2018 0.02  0.00 - 0.20 10*3/mm3 Final     Immature Grans, Absolute   Date Value Ref Range Status   06/23/2017 0.02 0.00 - 0.03 10*3/mm3 Final       Glucose   Date Value Ref Range Status   06/23/2017 119 (H) 65 - 99 mg/dL Final     Sodium   Date Value Ref Range Status   02/15/2018 141 136 - 145 mmol/L Final   06/23/2017 142 136 - 145 mmol/L Final     Potassium   Date Value Ref Range Status   02/15/2018 3.8 3.5 - 5.2 mmol/L Final   06/23/2017 3.6 3.5 - 5.2 mmol/L Final     CO2   Date Value Ref Range Status   06/23/2017 22.9 22.0 - 29.0 mmol/L Final     Total CO2   Date Value Ref Range Status   02/15/2018 25.5 22.0 - 29.0 mmol/L Final     Chloride   Date Value Ref Range Status   02/15/2018 104 98 - 107 mmol/L Final   06/23/2017 105 98 - 107 mmol/L Final     Anion Gap   Date Value Ref Range Status   06/23/2017 14.1 mmol/L Final     Creatinine   Date Value Ref Range Status   02/15/2018 0.91 0.76 - 1.27 mg/dL Final   06/23/2017 0.90 0.76 - 1.27 mg/dL Final     BUN   Date Value Ref Range Status   02/15/2018 15 6 - 20 mg/dL Final   06/23/2017 13 6 - 20 mg/dL Final     BUN/Creatinine Ratio   Date Value Ref Range Status   02/15/2018 16.5 7.0 - 25.0 Final   06/23/2017 14.4 7.0 - 25.0 Final     Calcium   Date Value Ref Range Status   02/15/2018 9.4 8.6 - 10.5 mg/dL Final   06/23/2017 9.3 8.6 - 10.5 mg/dL Final     eGFR Non  Amer   Date Value Ref Range Status   06/23/2017 93 >60 mL/min/1.73 Final     eGFR Non  Am   Date Value Ref Range Status   02/15/2018 92 >60 mL/min/1.73 Final     Alkaline Phosphatase   Date Value Ref Range Status   02/15/2018 60 39 - 117 U/L Final   06/23/2017 65 39 - 117 U/L Final     Total Protein   Date Value Ref Range Status   06/23/2017 7.3 6.0 - 8.5 g/dL Final     ALT (SGPT)   Date Value Ref Range Status   02/15/2018 34 1 - 41 U/L Final   06/23/2017 38 1 - 41 U/L Final     AST (SGOT)   Date Value Ref Range Status   02/15/2018 23 1 - 40 U/L Final   06/23/2017 43 (H) 1 - 40 U/L Final     Total  Bilirubin   Date Value Ref Range Status   02/15/2018 0.3 0.1 - 1.2 mg/dL Final   06/23/2017 0.5 0.1 - 1.2 mg/dL Final     Albumin   Date Value Ref Range Status   02/15/2018 4.10 3.50 - 5.20 g/dL Final   06/23/2017 4.30 3.50 - 5.20 g/dL Final     Globulin   Date Value Ref Range Status   06/23/2017 3.0 gm/dL Final     A/G Ratio   Date Value Ref Range Status   02/15/2018 1.7 g/dL Final   06/23/2017 1.4 g/dL Final         Imaging Results (last 7 days)     ** No results found for the last 168 hours. **            No notes on file    Assessment/Plan    1. Dysphagia: ? eoe vs related to sinuses and drainage    2. GERD: on pepcid without relief.  ? Worsened by weight gain over the past year    Plan  -start daily pepcid  -EGD for further evaluation  -advised to ER for any food sticking > 1 hour    Lester was seen today for heartburn and difficulty swallowing.    Diagnoses and all orders for this visit:    Gastroesophageal reflux disease, esophagitis presence not specified  -     Case Request; Standing  -     Implement Anesthesia Orders Day of Procedure; Standing  -     Obtain Informed Consent; Standing  -     lactated ringers infusion; Infuse 30 mL/hr into a venous catheter Continuous.  -     Case Request  -     pantoprazole (PROTONIX) 40 MG EC tablet; Take 1 tablet by mouth Every Morning Before Breakfast.    Pharyngoesophageal dysphagia  -     Case Request; Standing  -     Implement Anesthesia Orders Day of Procedure; Standing  -     Obtain Informed Consent; Standing  -     lactated ringers infusion; Infuse 30 mL/hr into a venous catheter Continuous.  -     Case Request  -     pantoprazole (PROTONIX) 40 MG EC tablet; Take 1 tablet by mouth Every Morning Before Breakfast.        I have discussed the above plan with the patient.  They verbalize understanding and are in agreement with the plan.  They have been advised to contact the office for any questions, concerns, or changes related to their health.    Dictated utilizing  Vitor dictation

## 2018-03-01 NOTE — PATIENT INSTRUCTIONS
Schedule the EGD    Start the pantoprazole every morning before breakfast    For any additional questions, concerns or changes to your condition after today's office visit please contact the office at [phone].

## 2018-04-06 ENCOUNTER — ANESTHESIA (OUTPATIENT)
Dept: GASTROENTEROLOGY | Facility: HOSPITAL | Age: 43
End: 2018-04-06

## 2018-04-06 ENCOUNTER — HOSPITAL ENCOUNTER (OUTPATIENT)
Facility: HOSPITAL | Age: 43
Setting detail: HOSPITAL OUTPATIENT SURGERY
Discharge: HOME | End: 2018-04-06
Attending: INTERNAL MEDICINE | Admitting: INTERNAL MEDICINE

## 2018-04-06 ENCOUNTER — ANESTHESIA EVENT (OUTPATIENT)
Dept: GASTROENTEROLOGY | Facility: HOSPITAL | Age: 43
End: 2018-04-06

## 2018-04-06 VITALS
RESPIRATION RATE: 16 BRPM | SYSTOLIC BLOOD PRESSURE: 94 MMHG | HEIGHT: 73 IN | BODY MASS INDEX: 34.99 KG/M2 | OXYGEN SATURATION: 95 % | DIASTOLIC BLOOD PRESSURE: 62 MMHG | TEMPERATURE: 97.8 F | WEIGHT: 264 LBS | HEART RATE: 61 BPM

## 2018-04-06 DIAGNOSIS — R13.14 PHARYNGOESOPHAGEAL DYSPHAGIA: ICD-10-CM

## 2018-04-06 DIAGNOSIS — K21.9 GASTROESOPHAGEAL REFLUX DISEASE, ESOPHAGITIS PRESENCE NOT SPECIFIED: ICD-10-CM

## 2018-04-06 PROCEDURE — 88305 TISSUE EXAM BY PATHOLOGIST: CPT | Performed by: INTERNAL MEDICINE

## 2018-04-06 PROCEDURE — 43239 EGD BIOPSY SINGLE/MULTIPLE: CPT | Performed by: INTERNAL MEDICINE

## 2018-04-06 PROCEDURE — 43450 DILATE ESOPHAGUS 1/MULT PASS: CPT | Performed by: INTERNAL MEDICINE

## 2018-04-06 PROCEDURE — S0260 H&P FOR SURGERY: HCPCS | Performed by: INTERNAL MEDICINE

## 2018-04-06 PROCEDURE — 88312 SPECIAL STAINS GROUP 1: CPT | Performed by: INTERNAL MEDICINE

## 2018-04-06 PROCEDURE — 25010000002 PROPOFOL 10 MG/ML EMULSION: Performed by: NURSE ANESTHETIST, CERTIFIED REGISTERED

## 2018-04-06 RX ORDER — SODIUM CHLORIDE, SODIUM LACTATE, POTASSIUM CHLORIDE, CALCIUM CHLORIDE 600; 310; 30; 20 MG/100ML; MG/100ML; MG/100ML; MG/100ML
1000 INJECTION, SOLUTION INTRAVENOUS CONTINUOUS
Status: DISCONTINUED | OUTPATIENT
Start: 2018-04-06 | End: 2018-04-06 | Stop reason: HOSPADM

## 2018-04-06 RX ORDER — LIDOCAINE HYDROCHLORIDE 20 MG/ML
INJECTION, SOLUTION INFILTRATION; PERINEURAL AS NEEDED
Status: DISCONTINUED | OUTPATIENT
Start: 2018-04-06 | End: 2018-04-06 | Stop reason: SURG

## 2018-04-06 RX ORDER — PROPOFOL 10 MG/ML
VIAL (ML) INTRAVENOUS AS NEEDED
Status: DISCONTINUED | OUTPATIENT
Start: 2018-04-06 | End: 2018-04-06 | Stop reason: SURG

## 2018-04-06 RX ORDER — SODIUM CHLORIDE, SODIUM LACTATE, POTASSIUM CHLORIDE, CALCIUM CHLORIDE 600; 310; 30; 20 MG/100ML; MG/100ML; MG/100ML; MG/100ML
30 INJECTION, SOLUTION INTRAVENOUS CONTINUOUS
Status: DISCONTINUED | OUTPATIENT
Start: 2018-04-06 | End: 2018-04-06 | Stop reason: HOSPADM

## 2018-04-06 RX ADMIN — SODIUM CHLORIDE, POTASSIUM CHLORIDE, SODIUM LACTATE AND CALCIUM CHLORIDE 1000 ML: 600; 310; 30; 20 INJECTION, SOLUTION INTRAVENOUS at 07:32

## 2018-04-06 RX ADMIN — PROPOFOL 50 MG: 10 INJECTION, EMULSION INTRAVENOUS at 08:24

## 2018-04-06 RX ADMIN — PROPOFOL 50 MG: 10 INJECTION, EMULSION INTRAVENOUS at 08:22

## 2018-04-06 RX ADMIN — PROPOFOL 100 MG: 10 INJECTION, EMULSION INTRAVENOUS at 08:09

## 2018-04-06 RX ADMIN — PROPOFOL 100 MG: 10 INJECTION, EMULSION INTRAVENOUS at 08:04

## 2018-04-06 RX ADMIN — PROPOFOL 50 MG: 10 INJECTION, EMULSION INTRAVENOUS at 08:13

## 2018-04-06 RX ADMIN — LIDOCAINE HYDROCHLORIDE 60 MG: 20 INJECTION, SOLUTION INFILTRATION; PERINEURAL at 08:04

## 2018-04-06 RX ADMIN — PROPOFOL 50 MG: 10 INJECTION, EMULSION INTRAVENOUS at 08:16

## 2018-04-06 RX ADMIN — PROPOFOL 50 MG: 10 INJECTION, EMULSION INTRAVENOUS at 08:19

## 2018-04-06 NOTE — H&P
[facility]  Pre Procedure History & Physical    Chief Complaint: GERD, dysphagia      HPI: 43 y.o. male with a past medical history noted below who presents for evaluation of difficulty swallowing and GERD.  Symptoms have been intermittent for the past year.  Possible precipitant of weight gain.  Occurring with all foods but more noticeable with sandwiches and dry breads.  Foods hang up in the upper esophagus.  Will sit for a few seconds.  It causes some distress as he feels that he is choking.  Finally passes down.  He does try to chew breads thoroughly before trying to swallow.  No associated nausea, vomiting, or weight loss.       Does have lots of heartburn issues for the past year.  Feels substernal burning and reflux.  Worse with tomatoes, spicy food.      Sinuses with significant drainage, takes allegra.  Currently worse with nighttime drainage.     No family history of GI malignancies.  No smoking or excess ETOH.  No abdominal surgeries.     Past Medical History:   Past Medical History:   Diagnosis Date   • GERD (gastroesophageal reflux disease)        Family History:  Family History   Problem Relation Age of Onset   • Lung cancer Father 69   • Hypertension Mother    • Diabetes Paternal Grandmother        Social History:   reports that he has never smoked. He has never used smokeless tobacco. He reports that he drinks alcohol. He reports that he does not use drugs.    Medications:   Prescriptions Prior to Admission   Medication Sig Dispense Refill Last Dose   • famotidine (PEPCID) 40 MG tablet Take 1 tablet by mouth Daily. 30 tablet 3 Past Month at Unknown time   • pantoprazole (PROTONIX) 40 MG EC tablet Take 1 tablet by mouth Every Morning Before Breakfast. 30 tablet 2 4/5/2018 at Unknown time   • fexofenadine (ALLEGRA) 30 MG tablet Take 30 mg by mouth Daily.   Unknown at Unknown time       Allergies:  Review of patient's allergies indicates no known allergies.    ROS:    Pertinent  "items are noted in HPI     Objective     Blood pressure 126/62, pulse 71, temperature 97.7 °F (36.5 °C), temperature source Oral, height 185.4 cm (73\"), weight 120 kg (264 lb), SpO2 95 %.    Physical Exam   Constitutional: Pt is oriented to person, place, and time and well-developed, well-nourished, and in no distress.   HENT:   Mouth/Throat: Oropharynx is clear and moist.   Neck: Normal range of motion. Neck supple.   Cardiovascular: Normal rate, regular rhythm and normal heart sounds.    Pulmonary/Chest: Effort normal and breath sounds normal. No respiratory distress. No  wheezes.   Abdominal: Soft. Bowel sounds are normal.   Skin: Skin is warm and dry.   Psychiatric: Mood, memory, affect and judgment normal.     Assessment/Plan     Diagnosis:  GERD, dysphagia        Anticipated Surgical Procedure:  EGD      The risks, benefits, and alternatives of this procedure have been discussed with the patient or the responsible party- the patient understands and agrees to proceed.  "

## 2018-04-06 NOTE — ANESTHESIA POSTPROCEDURE EVALUATION
"Patient: Lester Benoit    Procedure Summary     Date:  04/06/18 Room / Location:   [facility]    Anesthesia Start:  0754 Anesthesia Stop:  0832    Procedure:  esophagogastrodudencopy with bx (N/A Esophagus) Diagnosis:       Pharyngoesophageal dysphagia      Gastroesophageal reflux disease, esophagitis presence not specified      (Pharyngoesophageal dysphagia [R13.14])      (Gastroesophageal reflux disease, esophagitis presence not specified [K21.9])    Surgeon:  Anitra Hayes MD Provider:  Katie Barbosa MD    Anesthesia Type:  MAC ASA Status:  2          Anesthesia Type: MAC  Last vitals  BP   113/77 (04/06/18 0842)   Temp   36.6 °C (97.8 °F) (04/06/18 0842)   Pulse   71 (04/06/18 0842)   Resp   16 (04/06/18 0842)     SpO2   95 % (04/06/18 0842)     Post Anesthesia Care and Evaluation    Patient location during evaluation: PACU  Patient participation: complete - patient participated  Level of consciousness: awake  Pain score: 0  Pain management: adequate  Airway patency: patent  Anesthetic complications: No anesthetic complications    Cardiovascular status: acceptable  Respiratory status: acceptable  Hydration status: acceptable    Comments: Blood pressure 113/77, pulse 71, temperature 36.6 °C (97.8 °F), temperature source Oral, resp. rate 16, height 185.4 cm (73\"), weight 120 kg (264 lb), SpO2 95 %.    No anesthesia care post op    "

## 2018-04-06 NOTE — ANESTHESIA PREPROCEDURE EVALUATION
Anesthesia Evaluation     Patient summary reviewed and Nursing notes reviewed   NPO Solid Status: > 8 hours             Airway   Dental      Pulmonary    Cardiovascular         Neuro/Psych  GI/Hepatic/Renal/Endo    (+) obesity,  GERD,      Musculoskeletal     Abdominal    Substance History      OB/GYN          Other                        Anesthesia Plan    ASA 2     MAC     Anesthetic plan and risks discussed with patient.

## 2018-04-09 LAB
CYTO UR: NORMAL
LAB AP CASE REPORT: NORMAL
Lab: NORMAL
PATH REPORT.FINAL DX SPEC: NORMAL
PATH REPORT.GROSS SPEC: NORMAL

## 2018-04-09 NOTE — PROGRESS NOTES
His EGD shows mild gastritis and chronic esophagitis.      He does have evidence of eosinophilic esophagitis.    He needs to continue daily pantoprazole.    Follow up in ~3 months with me.

## 2018-05-11 ENCOUNTER — TELEPHONE (OUTPATIENT)
Dept: GASTROENTEROLOGY | Facility: CLINIC | Age: 43
End: 2018-05-11

## 2018-05-11 NOTE — TELEPHONE ENCOUNTER
Returned pt's call and advised per Dr Hayes that his egd shows mild gastritis and chronic esophagitis.  He does have evidence of eosinophilic esophagitis.  He needs to continue daily pantorpazole and f/u in 3 mo. Pt verb understanding and made f/u for 08/09 at 9am with Dr Hayes.

## 2018-05-11 NOTE — TELEPHONE ENCOUNTER
----- Message from Anitra Hayes MD sent at 4/9/2018  5:07 PM EDT -----  His EGD shows mild gastritis and chronic esophagitis.      He does have evidence of eosinophilic esophagitis.    He needs to continue daily pantoprazole.    Follow up in ~3 months with me.

## 2018-05-31 ENCOUNTER — TELEPHONE (OUTPATIENT)
Dept: GASTROENTEROLOGY | Facility: CLINIC | Age: 43
End: 2018-05-31

## 2018-05-31 DIAGNOSIS — K21.9 GASTROESOPHAGEAL REFLUX DISEASE, ESOPHAGITIS PRESENCE NOT SPECIFIED: ICD-10-CM

## 2018-05-31 DIAGNOSIS — R13.14 PHARYNGOESOPHAGEAL DYSPHAGIA: ICD-10-CM

## 2018-05-31 RX ORDER — PANTOPRAZOLE SODIUM 40 MG/1
40 TABLET, DELAYED RELEASE ORAL
Qty: 90 TABLET | Refills: 0 | Status: SHIPPED | OUTPATIENT
Start: 2018-05-31 | End: 2018-08-09 | Stop reason: ALTCHOICE

## 2018-05-31 NOTE — TELEPHONE ENCOUNTER
Faxed request received from [facility] for Pantoprazole 40 mg 1 tab po daily, #90.  See note of 5/11/18.  Has f/u appt 8/9/18.      Escribe completed as requested, R0.

## 2018-08-09 ENCOUNTER — OFFICE VISIT (OUTPATIENT)
Dept: GASTROENTEROLOGY | Facility: CLINIC | Age: 43
End: 2018-08-09

## 2018-08-09 VITALS
SYSTOLIC BLOOD PRESSURE: 122 MMHG | DIASTOLIC BLOOD PRESSURE: 86 MMHG | BODY MASS INDEX: 34.33 KG/M2 | WEIGHT: 259 LBS | TEMPERATURE: 98.1 F | HEIGHT: 73 IN

## 2018-08-09 DIAGNOSIS — K20.0 ESOPHAGITIS, EOSINOPHILIC: ICD-10-CM

## 2018-08-09 DIAGNOSIS — R13.19 ESOPHAGEAL DYSPHAGIA: Primary | ICD-10-CM

## 2018-08-09 DIAGNOSIS — K21.00 GASTROESOPHAGEAL REFLUX DISEASE WITH ESOPHAGITIS: Chronic | ICD-10-CM

## 2018-08-09 PROCEDURE — 99214 OFFICE O/P EST MOD 30 MIN: CPT | Performed by: INTERNAL MEDICINE

## 2018-08-09 RX ORDER — DEXLANSOPRAZOLE 30 MG/1
30 CAPSULE, DELAYED RELEASE ORAL DAILY
Qty: 30 CAPSULE | Refills: 2 | Status: SHIPPED | OUTPATIENT
Start: 2018-08-09 | End: 2018-09-08

## 2018-08-09 NOTE — PROGRESS NOTES
Chief Complaint   Patient presents with   • Follow-up     GERD (med is not helping)     Subjective     HPI  Lester Benoit is a 43 y.o. male who presents for follow up of GERD, dysphagia, eosinophilic esophagitis.  He had an EGD 4/6.  He has been on daily pantoprazole since that time.  He reports that dysphagia symptoms have improved.  However, he still persists with GERD symptoms.  Worse with overeating.  Described as burning and belching.  Occurring most days.  Has had some modest weight loss that he attributes to his symptoms.  Occasional diarrhea with the ppi.      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   HENT: Negative for trouble swallowing.    Respiratory: Negative.    Cardiovascular: Negative.  Negative for chest pain.   Gastrointestinal: Negative for abdominal distention, abdominal pain, anal bleeding, constipation, diarrhea, nausea and vomiting.        +GERD   Genitourinary: Negative for dysuria, frequency and hematuria.       Objective   Vitals:    08/09/18 0915   BP: 122/86   Temp: 98.1 °F (36.7 °C)     1    08/09/18 0915   Weight: 117 kg (259 lb)     Body mass index is 34.18 kg/m².      Physical Exam   Constitutional: He is oriented to person, place, and time. He appears well-developed and well-nourished. No distress.   HENT:    Head: Normocephalic and atraumatic.   Right Ear: External ear normal.   Left Ear: External ear normal.   Nose: Nose normal.   Mouth/Throat: Oropharynx is clear and moist.   Eyes: Conjunctivae and EOM are normal. Right eye exhibits no discharge. Left eye exhibits no discharge. No scleral icterus.   Neck: Normal range of motion. Neck supple. No thyromegaly present.   No supraclavicular adenopathy   Cardiovascular: Normal rate, regular rhythm, normal heart sounds and intact distal pulses.  Exam reveals no gallop.    No murmur heard.  No lower extremity edema   Pulmonary/Chest: Effort normal and breath sounds normal. No respiratory distress. He has no wheezes.   Abdominal: Soft. Normal appearance and bowel sounds are normal. He exhibits no distension and no mass. There is no hepatosplenomegaly. There is no tenderness. There is no rigidity, no rebound and no guarding. No hernia.   Genitourinary:   Genitourinary Comments: Rectal exam deferred   Musculoskeletal: Normal range of motion. He exhibits no edema or tenderness.   No atrophy of upper or lower extremities.  Normal digits and nails of both hands.   Lymphadenopathy:     He has no cervical adenopathy.   Neurological: He is alert and oriented to person, place, and time. He displays no atrophy. Coordination normal.   Skin: Skin is warm and dry. No rash noted. He is not diaphoretic. No erythema.   Psychiatric: He has a normal mood and affect. His behavior is normal. Judgment and thought content normal.   Vitals reviewed.      WBC   Date Value Ref Range Status   02/15/2018 7.91 4.50 - 10.70 10*3/mm3 Final   06/23/2017 11.45 (H) 4.50 - 10.70 10*3/mm3 Final     RBC   Date Value Ref Range Status   02/15/2018 5.11 4.60 - 6.00 10*6/mm3 Final   06/23/2017 5.30 4.60 - 6.00 10*6/mm3 Final     Hemoglobin   Date Value Ref Range Status   02/15/2018 14.7 13.7 - 17.6 g/dL Final   06/23/2017 15.8 13.7 - 17.6 g/dL Final     Hematocrit   Date Value Ref Range Status   02/15/2018 43.9  40.4 - 52.2 % Final   06/23/2017 45.0 40.4 - 52.2 % Final     MCV   Date Value Ref Range Status   02/15/2018 85.9 79.8 - 96.2 fL Final   06/23/2017 84.9 79.8 - 96.2 fL Final     MCH   Date Value Ref Range Status   02/15/2018 28.8 27.0 - 32.7 pg Final   06/23/2017 29.8 27.0 - 32.7 pg Final     MCHC   Date Value Ref Range Status   02/15/2018 33.5 32.6 - 36.4 g/dL Final   06/23/2017 35.1 32.6 - 36.4 g/dL Final     RDW   Date Value Ref Range Status   02/15/2018 13.8 11.5 - 14.5 % Final   06/23/2017 13.6 11.5 - 14.5 % Final     RDW-SD   Date Value Ref Range Status   06/23/2017 41.7 37.0 - 54.0 fl Final     MPV   Date Value Ref Range Status   06/23/2017 10.6 6.0 - 12.0 fL Final     Platelets   Date Value Ref Range Status   02/15/2018 192 140 - 500 10*3/mm3 Final   06/23/2017 187 140 - 500 10*3/mm3 Final     Neutrophil %   Date Value Ref Range Status   06/23/2017 55.4 42.7 - 76.0 % Final     Neutrophil Rel %   Date Value Ref Range Status   02/15/2018 54.2 42.7 - 76.0 % Final     Lymphocyte %   Date Value Ref Range Status   06/23/2017 37.7 19.6 - 45.3 % Final     Lymphocyte Rel %   Date Value Ref Range Status   02/15/2018 37.9 19.6 - 45.3 % Final     Monocyte %   Date Value Ref Range Status   06/23/2017 5.4 5.0 - 12.0 % Final     Monocyte Rel %   Date Value Ref Range Status   02/15/2018 5.6 5.0 - 12.0 % Final     Eosinophil %   Date Value Ref Range Status   06/23/2017 1.1 0.3 - 6.2 % Final     Eosinophil Rel %   Date Value Ref Range Status   02/15/2018 2.0 0.3 - 6.2 % Final     Basophil %   Date Value Ref Range Status   06/23/2017 0.2 0.0 - 1.5 % Final     Basophil Rel %   Date Value Ref Range Status   02/15/2018 0.3 0.0 - 1.5 % Final     Immature Grans %   Date Value Ref Range Status   06/23/2017 0.2 0.0 - 0.5 % Final     Neutrophils, Absolute   Date Value Ref Range Status   06/23/2017 6.34 1.90 - 8.10 10*3/mm3 Final     Neutrophils Absolute   Date Value Ref Range Status   02/15/2018 4.29 1.90 - 8.10 10*3/mm3 Final      Lymphocytes, Absolute   Date Value Ref Range Status   06/23/2017 4.32 0.90 - 4.80 10*3/mm3 Final     Lymphocytes Absolute   Date Value Ref Range Status   02/15/2018 3.00 0.90 - 4.80 10*3/mm3 Final     Monocytes, Absolute   Date Value Ref Range Status   06/23/2017 0.62 0.20 - 1.20 10*3/mm3 Final     Monocytes Absolute   Date Value Ref Range Status   02/15/2018 0.44 0.20 - 1.20 10*3/mm3 Final     Eosinophils, Absolute   Date Value Ref Range Status   06/23/2017 0.13 0.00 - 0.70 10*3/mm3 Final     Eosinophils Absolute   Date Value Ref Range Status   02/15/2018 0.16 0.00 - 0.70 10*3/mm3 Final     Basophils, Absolute   Date Value Ref Range Status   06/23/2017 0.02 0.00 - 0.20 10*3/mm3 Final     Basophils Absolute   Date Value Ref Range Status   02/15/2018 0.02 0.00 - 0.20 10*3/mm3 Final     Immature Grans, Absolute   Date Value Ref Range Status   06/23/2017 0.02 0.00 - 0.03 10*3/mm3 Final       Lab Results   Component Value Date    GLUCOSE 119 (H) 06/23/2017    BUN 15 02/15/2018    CREATININE 0.91 02/15/2018    EGFRIFNONA 92 02/15/2018    EGFRIFAFRI 111 02/15/2018    BCR 16.5 02/15/2018    CO2 25.5 02/15/2018    CALCIUM 9.4 02/15/2018    PROTENTOTREF 6.5 02/15/2018    ALBUMIN 4.10 02/15/2018    LABIL2 1.7 02/15/2018    AST 23 02/15/2018    ALT 34 02/15/2018         Imaging Results (last 7 days)     ** No results found for the last 168 hours. **            Assessment/Plan    Esophageal dysphagia: Due to his eosinophilic esophagitis.  This has improved with the PPI    Esophagitis, eosinophilic: 20 eos per [facility] on his April EGD    Gastroesophageal reflux disease: Persists despite pantoprazole    Plan  Will have him stop the pantoprazole.  Start Dexilant to see if this gives him better control of the symptoms  Discussed the possibility of a food elimination diet for the eosinophilic esophagitis but recommended that he see an dietitian to help with this if desired  He is losing some weight and hopefully continues to do so  as this will help his symptoms  Lester was seen today for follow-up.    Diagnoses and all orders for this visit:    Esophageal dysphagia    Esophagitis, eosinophilic    Gastroesophageal reflux disease with esophagitis    Other orders  -     dexlansoprazole (DEXILANT) 30 MG capsule; Take 1 capsule by mouth Daily for 30 days.        Dictated utilizing Dragon dictation

## 2018-08-09 NOTE — PATIENT INSTRUCTIONS
Stop the pantoprazole.  Start the dexilant    For any additional questions, concerns or changes to your condition after today's office visit please contact the office at [phone].

## 2018-08-16 ENCOUNTER — TELEPHONE (OUTPATIENT)
Dept: GASTROENTEROLOGY | Facility: CLINIC | Age: 43
End: 2018-08-16

## 2018-08-16 NOTE — TELEPHONE ENCOUNTER
----- Message from Matthew Mckeon sent at 8/16/2018  1:24 PM EDT -----  Regarding: MEDS TOO EXPENSIVE   Contact: [phone]  PT IS CALLING STATING THE MEDICATION dexlansoprazole (DEXILANT) 30 MG capsule IS TO EXPENSIVE FOR HIM TO GET.

## 2018-08-17 RX ORDER — ESOMEPRAZOLE MAGNESIUM 40 MG/1
40 CAPSULE, DELAYED RELEASE ORAL DAILY
Qty: 30 CAPSULE | Refills: 5 | Status: SHIPPED | OUTPATIENT
Start: 2018-08-17 | End: 2019-02-08 | Stop reason: SDUPTHER

## 2018-08-17 NOTE — TELEPHONE ENCOUNTER
Returned pt's call and he reports that the dexilant 30mg was going to run $85 per month and he can not afford this.  Pt states the only other ppi he has tried was pantoprazole.  Advised pt we will send nexium 40mg po daily #30 with 2 refills to his [facility] pharmacy and will update Dr Hayes. Also advised pt to let us know if this too is too expensive.  Pt verb understanding.

## 2019-02-08 ENCOUNTER — TELEPHONE (OUTPATIENT)
Dept: GASTROENTEROLOGY | Facility: CLINIC | Age: 44
End: 2019-02-08

## 2019-02-08 RX ORDER — ESOMEPRAZOLE MAGNESIUM 40 MG/1
40 CAPSULE, DELAYED RELEASE ORAL DAILY
Qty: 90 CAPSULE | Refills: 1 | Status: SHIPPED | OUTPATIENT
Start: 2019-02-08 | End: 2019-08-28 | Stop reason: SDUPTHER

## 2019-02-08 NOTE — TELEPHONE ENCOUNTER
Faxed request received from [facility] for esomeprazole 40 mg 1 tab po daily, #68.      See note of 8/16/18.    Message to DR Hayes.

## 2019-06-12 ENCOUNTER — OFFICE VISIT (OUTPATIENT)
Dept: INTERNAL MEDICINE | Facility: CLINIC | Age: 44
End: 2019-06-12

## 2019-06-12 VITALS
BODY MASS INDEX: 33.62 KG/M2 | DIASTOLIC BLOOD PRESSURE: 90 MMHG | HEART RATE: 74 BPM | OXYGEN SATURATION: 98 % | WEIGHT: 254.8 LBS | TEMPERATURE: 98.6 F | SYSTOLIC BLOOD PRESSURE: 120 MMHG

## 2019-06-12 DIAGNOSIS — F32.9 REACTIVE DEPRESSION: Primary | ICD-10-CM

## 2019-06-12 PROCEDURE — 99214 OFFICE O/P EST MOD 30 MIN: CPT | Performed by: FAMILY MEDICINE

## 2019-06-12 RX ORDER — CITALOPRAM 10 MG/1
10 TABLET ORAL DAILY
Qty: 30 TABLET | Refills: 6 | Status: SHIPPED | OUTPATIENT
Start: 2019-06-12 | End: 2019-07-17 | Stop reason: SDUPTHER

## 2019-06-12 NOTE — PROGRESS NOTES
Lester Benoit is a 44 y.o. male.      Assessment/Plan   Problem List Items Addressed This Visit        Other    Reactive depression - Primary    Relevant Medications    citalopram (CELEXA) 10 MG tablet         25 minutes was spent face-to-face with patient greater than 50% of the time discussing disease pathology and counseling regarding depression anxiety and situational factors contributing  Followed by psych -     Initiate Celexa sleep same time every night trial melatonin if no improvement at sleep medication follow-up as needed or  Return in about 1 month (around 7/12/2019), or if symptoms worsen or fail to improve, for Recheck, Next scheduled follow up.      Chief Complaint   Patient presents with   • Depression     Social History     Tobacco Use   • Smoking status: Never Smoker   • Smokeless tobacco: Never Used   Substance Use Topics   • Alcohol use: Yes     Comment: socially   • Drug use: No       Depression   Visit Type: initial  Onset of symptoms: more than 1 year ago  Progression since onset: gradually worsening  Patient presents with the following symptoms: hyperventilation, insomnia, nervousness/anxiety, panic and restlessness.  Frequency of symptoms: most days   Severity: causing significant distress   Aggravated by: family issues and medication  Sleep per night: 5 hours  Sleep quality: non-restorative  Risk factors: marital problems  No history of: anemia, anxiety/panic attacks, fibromyalgia, mental illness and substance abuse  Treatments tried: none.           The following portions of the patient's history were reviewed and updated as appropriate:PMHroutine: Social history , Allergies, Current Medications, Active Problem List and Health Maintenance    Review of Systems   Constitutional: Negative.    HENT: Negative.    Respiratory: Negative.    Cardiovascular: Negative.    Gastrointestinal: Negative.    Genitourinary: Negative.    Psychiatric/Behavioral: Positive for dysphoric mood and sleep  disturbance. Negative for substance abuse. The patient is nervous/anxious and has insomnia.        Objective   Vitals:    06/12/19 1137   BP: 120/90   BP Location: Right arm   Patient Position: Sitting   Cuff Size: Large Adult   Pulse: 74   Temp: 98.6 °F (37 °C)   TempSrc: Oral   SpO2: 98%   Weight: 116 kg (254 lb 12.8 oz)     Body mass index is 33.62 kg/m².  Physical Exam   Constitutional: He is oriented to person, place, and time. He appears well-developed and well-nourished.   HENT:   Head: Normocephalic and atraumatic.   Eyes: EOM are normal. Right eye exhibits no discharge. Left eye exhibits no discharge. No scleral icterus.   Neck: No thyromegaly present.   Cardiovascular: Regular rhythm.   Pulmonary/Chest: Effort normal and breath sounds normal.   Neurological: He is alert and oriented to person, place, and time.   Psychiatric: He has a normal mood and affect. His behavior is normal. Judgment and thought content normal.   Vitals reviewed.    Reviewed Data:  No visits with results within 1 Month(s) from this visit.   Latest known visit with results is:   Admission on 04/06/2018, Discharged on 04/06/2018   Component Date Value Ref Range Status   • Case Report 04/06/2018    Final                    Value:Surgical Pathology Report                         Case: [id number]                                  Authorizing Provider:  Anitra Hayes MD         Collected:           04/06/2018 08:12 AM          Ordering Location:     [facility]  Received:            04/06/2018 10:11 AM                                 ENDO SUITES                                                                  Pathologist:           Frandy Ward MD                                                                           Specimens:   1) - Small Intestine, Duodenum                                                                      2) -  Stomach, antral and body bx                                                                    3) - GE Junction                                                                                    4) - Esophagus, Mid, mid esophagus                                                        • Final Diagnosis 04/06/2018    Final                    Value:This result contains rich text formatting which cannot be displayed here.   • Gross Description 04/06/2018    Final                    Value:This result contains rich text formatting which cannot be displayed here.   • Microscopic Description 04/06/2018    Final                    Value:This result contains rich text formatting which cannot be displayed here.

## 2019-07-17 RX ORDER — CITALOPRAM 10 MG/1
10 TABLET ORAL DAILY
Qty: 90 TABLET | Refills: 1 | Status: SHIPPED | OUTPATIENT
Start: 2019-07-17 | End: 2019-07-18 | Stop reason: SDUPTHER

## 2019-07-18 ENCOUNTER — OFFICE VISIT (OUTPATIENT)
Dept: INTERNAL MEDICINE | Facility: CLINIC | Age: 44
End: 2019-07-18

## 2019-07-18 VITALS
DIASTOLIC BLOOD PRESSURE: 80 MMHG | HEART RATE: 77 BPM | WEIGHT: 243.4 LBS | SYSTOLIC BLOOD PRESSURE: 120 MMHG | OXYGEN SATURATION: 97 % | TEMPERATURE: 98.3 F | BODY MASS INDEX: 32.12 KG/M2

## 2019-07-18 DIAGNOSIS — F51.01 PRIMARY INSOMNIA: ICD-10-CM

## 2019-07-18 DIAGNOSIS — F32.9 REACTIVE DEPRESSION: Primary | ICD-10-CM

## 2019-07-18 PROCEDURE — 99213 OFFICE O/P EST LOW 20 MIN: CPT | Performed by: FAMILY MEDICINE

## 2019-07-18 RX ORDER — CITALOPRAM 10 MG/1
10 TABLET ORAL DAILY
Qty: 30 TABLET | Refills: 6 | Status: SHIPPED | OUTPATIENT
Start: 2019-07-18 | End: 2020-02-21

## 2019-07-18 RX ORDER — ZOLPIDEM TARTRATE 10 MG/1
TABLET ORAL
Qty: 30 TABLET | Refills: 3 | Status: SHIPPED | OUTPATIENT
Start: 2019-07-18 | End: 2019-08-28

## 2019-07-18 NOTE — PROGRESS NOTES
Lester Benoit is a 44 y.o. male.      Assessment/Plan   Problem List Items Addressed This Visit        Other    Reactive depression - Primary    Relevant Medications    citalopram (CELEXA) 10 MG tablet    zolpidem (AMBIEN) 10 MG tablet    Primary insomnia         Initiate Ambien for sleep continue Celexa plan follow-up otherwise as needed or in 6 months    Return in about 3 months (around 10/18/2019), or if symptoms worsen or fail to improve, for Recheck, Next scheduled follow up.      Chief Complaint   Patient presents with   • follow up to depression   Insomnia  Social History     Tobacco Use   • Smoking status: Never Smoker   • Smokeless tobacco: Never Used   Substance Use Topics   • Alcohol use: Yes     Comment: socially   • Drug use: No       History of Present Illness   Patient follow-up appoint for depression after initiating Celexa doing well with no unwanted side effects he like to continue the same, is going through a rough.  With family no explosive behavior or physical danger,  Patient new problem of insomnia not improving with initiating Celexa he has some work shift and is not going to bed same time every night sometimes he goes to bed at 6:30 in the morning he like to try something to help this melatonin is not indicated with shift study  The following portions of the patient's history were reviewed and updated as appropriate:PMHroutine: Social history , Allergies, Current Medications, Active Problem List and Health Maintenance    Review of Systems   Constitutional: Negative.    HENT: Negative.    Neurological: Negative.    Hematological: Negative.    Psychiatric/Behavioral: Negative.        Objective   Vitals:    07/18/19 1523   BP: 120/80   BP Location: Left arm   Patient Position: Sitting   Cuff Size: Adult   Pulse: 77   Temp: 98.3 °F (36.8 °C)   TempSrc: Oral   SpO2: 97%   Weight: 110 kg (243 lb 6.4 oz)     Body mass index is 32.12 kg/m².  Physical Exam   Constitutional: He appears well-developed  and well-nourished.   Cardiovascular: Normal rate.   Pulmonary/Chest: Effort normal and breath sounds normal.   Psychiatric: He has a normal mood and affect. His behavior is normal. Judgment and thought content normal.   Nursing note and vitals reviewed.    Reviewed Data:  No visits with results within 1 Month(s) from this visit.   Latest known visit with results is:   Admission on 04/06/2018, Discharged on 04/06/2018   Component Date Value Ref Range Status   • Case Report 04/06/2018    Final                    Value:Surgical Pathology Report                         Case: [id number]                                  Authorizing Provider:  Anitra Hayes MD         Collected:           04/06/2018 08:12 AM          Ordering Location:     [facility]  Received:            04/06/2018 10:11 AM                                 ENDO SUITES                                                                  Pathologist:           Frandy Ward MD                                                                           Specimens:   1) - Small Intestine, Duodenum                                                                      2) - Stomach, antral and body bx                                                                    3) - GE Junction                                                                                    4) - Esophagus, Mid, mid esophagus                                                        • Final Diagnosis 04/06/2018    Final                    Value:This result contains rich text formatting which cannot be displayed here.   • Gross Description 04/06/2018    Final                    Value:This result contains rich text formatting which cannot be displayed here.   • Microscopic Description 04/06/2018    Final                    Value:This result contains rich text formatting which cannot be displayed  here.

## 2019-08-23 RX ORDER — ESOMEPRAZOLE MAGNESIUM 40 MG/1
CAPSULE, DELAYED RELEASE ORAL
Qty: 90 CAPSULE | Refills: 1 | OUTPATIENT
Start: 2019-08-23

## 2019-08-28 ENCOUNTER — OFFICE VISIT (OUTPATIENT)
Dept: GASTROENTEROLOGY | Facility: CLINIC | Age: 44
End: 2019-08-28

## 2019-08-28 VITALS
TEMPERATURE: 97.9 F | DIASTOLIC BLOOD PRESSURE: 76 MMHG | WEIGHT: 230 LBS | SYSTOLIC BLOOD PRESSURE: 110 MMHG | BODY MASS INDEX: 31.15 KG/M2 | HEIGHT: 72 IN

## 2019-08-28 DIAGNOSIS — K20.0 ESOPHAGITIS, EOSINOPHILIC: ICD-10-CM

## 2019-08-28 DIAGNOSIS — K21.00 GASTROESOPHAGEAL REFLUX DISEASE WITH ESOPHAGITIS: Primary | Chronic | ICD-10-CM

## 2019-08-28 PROCEDURE — 99213 OFFICE O/P EST LOW 20 MIN: CPT | Performed by: NURSE PRACTITIONER

## 2019-08-28 RX ORDER — ESOMEPRAZOLE MAGNESIUM 40 MG/1
40 CAPSULE, DELAYED RELEASE ORAL DAILY
Qty: 90 CAPSULE | Refills: 3 | Status: SHIPPED | OUTPATIENT
Start: 2019-08-28 | End: 2020-11-02

## 2019-08-28 RX ORDER — ESOMEPRAZOLE MAGNESIUM 40 MG/1
CAPSULE, DELAYED RELEASE ORAL
Qty: 90 CAPSULE | Refills: 1 | OUTPATIENT
Start: 2019-08-28

## 2019-08-28 NOTE — PROGRESS NOTES
Chief Complaint   Patient presents with   • Heartburn       Lester Benoit is a  44 y.o. male here for a follow up visit for GERD.    HPI  44-year-old male presents today for follow-up visit for GERD.  He is a patient of Dr. Miles.  He was last seen in the office on 8/9/2018.  He had underwent an endoscopy on 4/6/2018 that had showed an irregular Z line with LA grade a esophagitis with eosinophilic esophagitis, gastritis and duodenitis with a 1 cm hiatal hernia he was also dilated at that time.  He is happy to report that the Nexium 40 mg once daily works well for him.  He does have some occasional esophageal burning but he thinks this is worse when he has not eaten in a while.  He denies any dysphagia, abdominal pain, nausea and vomiting, diarrhea, constipation, rectal bleeding or melena.  He admits his appetite is good and his weight has dropped from 254 pounds last May to 230 pounds today.  Past Medical History:   Diagnosis Date   • GERD (gastroesophageal reflux disease)        Past Surgical History:   Procedure Laterality Date   • ENDOSCOPY N/A 4/6/2018    Z-line irregular, 39 cm from the incisors, LA Grade A esophagitis, esophageal mucosal changes suggestive of eosinophilic esophagitis. Dilated, gastritis, duodenitis, 1 cm hiatal hernia   • KNEE ACL RECONSTRUCTION Right 2010       Scheduled Meds:    Continuous Infusions:  No current facility-administered medications for this visit.     PRN Meds:.    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   HENT: Negative for nosebleeds, postnasal drip, sore throat, trouble swallowing and voice change.    Respiratory: Negative for cough, choking, chest tightness, shortness of breath and wheezing.    Cardiovascular: Negative for chest pain, palpitations and leg swelling.   Gastrointestinal: Negative for abdominal distention, abdominal pain, anal bleeding, blood in stool, constipation, diarrhea, nausea, rectal pain and vomiting.   Endocrine: Negative for polydipsia, polyphagia and polyuria.   Musculoskeletal: Negative for gait problem.   Skin: Negative for rash and wound.   Allergic/Immunologic: Negative for food allergies.   Neurological: Negative for dizziness, speech difficulty and light-headedness.   Psychiatric/Behavioral: Negative for confusion, self-injury, sleep disturbance and suicidal ideas.       Vitals:    08/28/19 1423   BP: 110/76   Temp: 97.9 °F (36.6 °C)       Physical Exam   Constitutional: He is oriented to person, place, and time. He appears well-developed and well-nourished. He does not appear ill. No distress.   HENT:   Head: Normocephalic.   Eyes: Pupils are equal, round, and reactive to light.   Cardiovascular: Normal rate, regular rhythm and normal heart sounds.   Pulmonary/Chest: Effort normal and breath sounds normal.   Abdominal: Soft. Bowel sounds are normal. He exhibits no distension and no mass. There is no hepatosplenomegaly. There is no tenderness. There is no rebound and no guarding. No hernia.   Musculoskeletal: Normal range of motion.   Neurological: He is alert and oriented to person, place, and time.   Skin: Skin is warm and dry.   Psychiatric: He has a normal mood and affect. His speech is normal and behavior is normal. Judgment normal.       No images are  attached to the encounter.    Assessment and plan    1. Gastroesophageal reflux disease with esophagitis    2. Esophagitis, eosinophilic    GERD seems well controlled at this time on Nexium 40 mg daily.  Will refill it for a year.  Continue GERD precautions.  Follow-up with me in the office in 1 year.

## 2020-02-21 RX ORDER — CITALOPRAM 10 MG/1
TABLET ORAL
Qty: 90 TABLET | Refills: 0 | Status: SHIPPED | OUTPATIENT
Start: 2020-02-21 | End: 2020-02-27

## 2020-02-27 ENCOUNTER — OFFICE VISIT (OUTPATIENT)
Dept: INTERNAL MEDICINE | Facility: CLINIC | Age: 45
End: 2020-02-27

## 2020-02-27 VITALS
TEMPERATURE: 98 F | OXYGEN SATURATION: 98 % | SYSTOLIC BLOOD PRESSURE: 118 MMHG | DIASTOLIC BLOOD PRESSURE: 80 MMHG | HEART RATE: 60 BPM | WEIGHT: 241.4 LBS | BODY MASS INDEX: 32.7 KG/M2 | HEIGHT: 72 IN

## 2020-02-27 DIAGNOSIS — F34.1 DYSTHYMIA: Primary | ICD-10-CM

## 2020-02-27 PROCEDURE — 99213 OFFICE O/P EST LOW 20 MIN: CPT | Performed by: FAMILY MEDICINE

## 2020-02-27 RX ORDER — CITALOPRAM HYDROBROMIDE 20 MG/10ML
SOLUTION, ORAL ORAL
Qty: 50 ML | Refills: 0 | Status: SHIPPED | OUTPATIENT
Start: 2020-02-27 | End: 2020-10-02

## 2020-02-27 NOTE — PROGRESS NOTES
"Lester Benoit is a 44 y.o. male.      Assessment/Plan   Problem List Items Addressed This Visit        Other    Dysthymia - Primary    Relevant Medications    citalopram (CeleXA) 10 MG/5ML suspension         Wean off citalopram liquid formulation was given.  Samples of Trintellix 5 mg given if depressive irritable anxiety symptoms recur we will follow-up as needed or    Return in about 6 months (around 8/27/2020), or if symptoms worsen or fail to improve, for Recheck, Next scheduled follow up.      Chief Complaint   Patient presents with   • would like to stop taking depression     Social History     Tobacco Use   • Smoking status: Never Smoker   • Smokeless tobacco: Never Used   Substance Use Topics   • Alcohol use: Yes     Comment: socially   • Drug use: No       History of Present Illness   Patient would like to discontinue his citalopram because of sexual side effects delayed orgasm he says he is feeling much better situation is improved he tried to stop the citalopram and within 4 days he was getting side effects of brain shock and feeling poorly.  He has been taking citalopram taking 10 mg since June 2019  The following portions of the patient's history were reviewed and updated as appropriate:PMHroutine: Social history , Allergies, Current Medications, Active Problem List and Health Maintenance    Review of Systems   Constitutional: Negative.    Musculoskeletal: Negative.    Psychiatric/Behavioral: Negative.        Objective   Vitals:    02/27/20 0740   BP: 118/80   BP Location: Right arm   Patient Position: Sitting   Cuff Size: Large Adult   Pulse: 60   Temp: 98 °F (36.7 °C)   TempSrc: Oral   SpO2: 98%   Weight: 109 kg (241 lb 6.4 oz)   Height: 182.9 cm (72\")     Body mass index is 32.74 kg/m².  Physical Exam   Constitutional: He appears well-developed and well-nourished.   HENT:   Head: Normocephalic and atraumatic.   Eyes: Pupils are equal, round, and reactive to light. Conjunctivae are normal. No scleral " icterus.   Cardiovascular: Normal rate.   Psychiatric: He has a normal mood and affect. His behavior is normal. Judgment and thought content normal.   Nursing note and vitals reviewed.    Reviewed Data:  No visits with results within 1 Month(s) from this visit.   Latest known visit with results is:   Admission on 04/06/2018, Discharged on 04/06/2018   Component Date Value Ref Range Status   • Case Report 04/06/2018    Final                    Value:Surgical Pathology Report                         Case: [id number]                                  Authorizing Provider:  Anitra Hayes MD         Collected:           04/06/2018 08:12 AM          Ordering Location:     [facility]  Received:            04/06/2018 10:11 AM                                 ENDO SUITES                                                                  Pathologist:           Frandy Ward MD                                                                           Specimens:   1) - Small Intestine, Duodenum                                                                      2) - Stomach, antral and body bx                                                                    3) - GE Junction                                                                                    4) - Esophagus, Mid, mid esophagus                                                        • Final Diagnosis 04/06/2018    Final                    Value:This result contains rich text formatting which cannot be displayed here.   • Gross Description 04/06/2018    Final                    Value:This result contains rich text formatting which cannot be displayed here.   • Microscopic Description 04/06/2018    Final                    Value:This result contains rich text formatting which cannot be displayed here.

## 2020-07-16 RX ORDER — CITALOPRAM 10 MG/1
TABLET ORAL
Qty: 30 TABLET | Refills: 0 | Status: SHIPPED | OUTPATIENT
Start: 2020-07-16 | End: 2020-10-02

## 2020-10-02 RX ORDER — CITALOPRAM 10 MG/1
TABLET ORAL
Qty: 30 TABLET | Refills: 4 | Status: SHIPPED | OUTPATIENT
Start: 2020-10-02 | End: 2020-12-16 | Stop reason: SDUPTHER

## 2020-11-02 RX ORDER — ESOMEPRAZOLE MAGNESIUM 40 MG/1
CAPSULE, DELAYED RELEASE ORAL
Qty: 90 CAPSULE | Refills: 3 | Status: SHIPPED | OUTPATIENT
Start: 2020-11-02 | End: 2022-01-03 | Stop reason: SDUPTHER

## 2020-12-16 NOTE — TELEPHONE ENCOUNTER
[facility] ([phone]) sent a 90 day refill request for citalopram 10 mg 1 daily - patient was last seen on 2/27/20 - no follow up scheduled.

## 2020-12-17 RX ORDER — CITALOPRAM 10 MG/1
10 TABLET ORAL DAILY
Qty: 90 TABLET | Refills: 0 | Status: SHIPPED | OUTPATIENT
Start: 2020-12-17 | End: 2021-03-24

## 2021-01-27 ENCOUNTER — IMMUNIZATION (OUTPATIENT)
Dept: VACCINE CLINIC | Facility: HOSPITAL | Age: 46
End: 2021-01-27

## 2021-01-27 PROCEDURE — 91301 HC SARSCO02 VAC 100MCG/0.5ML IM: CPT | Performed by: OBSTETRICS & GYNECOLOGY

## 2021-01-27 PROCEDURE — 0011A: CPT | Performed by: OBSTETRICS & GYNECOLOGY

## 2021-02-09 ENCOUNTER — OFFICE VISIT (OUTPATIENT)
Dept: INTERNAL MEDICINE | Facility: CLINIC | Age: 46
End: 2021-02-09

## 2021-02-09 ENCOUNTER — LAB (OUTPATIENT)
Dept: LAB | Facility: HOSPITAL | Age: 46
End: 2021-02-09

## 2021-02-09 VITALS
SYSTOLIC BLOOD PRESSURE: 114 MMHG | OXYGEN SATURATION: 97 % | HEART RATE: 78 BPM | HEIGHT: 72 IN | TEMPERATURE: 96.8 F | DIASTOLIC BLOOD PRESSURE: 82 MMHG | BODY MASS INDEX: 35.59 KG/M2 | WEIGHT: 262.8 LBS

## 2021-02-09 DIAGNOSIS — R31.0 GROSS HEMATURIA: ICD-10-CM

## 2021-02-09 DIAGNOSIS — R31.0 GROSS HEMATURIA: Primary | ICD-10-CM

## 2021-02-09 LAB
ALBUMIN SERPL-MCNC: 4.1 G/DL (ref 3.5–5.2)
ALBUMIN/GLOB SERPL: 1.7 G/DL
ALP SERPL-CCNC: 67 U/L (ref 39–117)
ALT SERPL W P-5'-P-CCNC: 30 U/L (ref 1–41)
ANION GAP SERPL CALCULATED.3IONS-SCNC: 10.5 MMOL/L (ref 5–15)
AST SERPL-CCNC: 25 U/L (ref 1–40)
BASOPHILS # BLD AUTO: 0.03 10*3/MM3 (ref 0–0.2)
BASOPHILS NFR BLD AUTO: 0.5 % (ref 0–1.5)
BILIRUB BLD-MCNC: NEGATIVE MG/DL
BILIRUB SERPL-MCNC: 0.5 MG/DL (ref 0–1.2)
BUN SERPL-MCNC: 11 MG/DL (ref 6–20)
BUN/CREAT SERPL: 12.4 (ref 7–25)
CALCIUM SPEC-SCNC: 9.6 MG/DL (ref 8.6–10.5)
CHLORIDE SERPL-SCNC: 104 MMOL/L (ref 98–107)
CLARITY, POC: ABNORMAL
CO2 SERPL-SCNC: 25.5 MMOL/L (ref 22–29)
COLOR UR: YELLOW
CREAT SERPL-MCNC: 0.89 MG/DL (ref 0.76–1.27)
DEPRECATED RDW RBC AUTO: 42.2 FL (ref 37–54)
EOSINOPHIL # BLD AUTO: 0.1 10*3/MM3 (ref 0–0.4)
EOSINOPHIL NFR BLD AUTO: 1.5 % (ref 0.3–6.2)
ERYTHROCYTE [DISTWIDTH] IN BLOOD BY AUTOMATED COUNT: 13.5 % (ref 12.3–15.4)
GFR SERPL CREATININE-BSD FRML MDRD: 93 ML/MIN/1.73
GLOBULIN UR ELPH-MCNC: 2.4 GM/DL
GLUCOSE SERPL-MCNC: 88 MG/DL (ref 65–99)
GLUCOSE UR STRIP-MCNC: NEGATIVE MG/DL
HCT VFR BLD AUTO: 46.4 % (ref 37.5–51)
HGB BLD-MCNC: 15.1 G/DL (ref 13–17.7)
IMM GRANULOCYTES # BLD AUTO: 0.03 10*3/MM3 (ref 0–0.05)
IMM GRANULOCYTES NFR BLD AUTO: 0.5 % (ref 0–0.5)
KETONES UR QL: NEGATIVE
LEUKOCYTE EST, POC: NEGATIVE
LYMPHOCYTES # BLD AUTO: 2.36 10*3/MM3 (ref 0.7–3.1)
LYMPHOCYTES NFR BLD AUTO: 36 % (ref 19.6–45.3)
MCH RBC QN AUTO: 27.6 PG (ref 26.6–33)
MCHC RBC AUTO-ENTMCNC: 32.5 G/DL (ref 31.5–35.7)
MCV RBC AUTO: 84.8 FL (ref 79–97)
MONOCYTES # BLD AUTO: 0.32 10*3/MM3 (ref 0.1–0.9)
MONOCYTES NFR BLD AUTO: 4.9 % (ref 5–12)
NEUTROPHILS NFR BLD AUTO: 3.71 10*3/MM3 (ref 1.7–7)
NEUTROPHILS NFR BLD AUTO: 56.6 % (ref 42.7–76)
NITRITE UR-MCNC: NEGATIVE MG/ML
NRBC BLD AUTO-RTO: 0 /100 WBC (ref 0–0.2)
PH UR: 7.5 [PH] (ref 5–8)
PLATELET # BLD AUTO: 220 10*3/MM3 (ref 140–450)
PMV BLD AUTO: 11 FL (ref 6–12)
POTASSIUM SERPL-SCNC: 3.9 MMOL/L (ref 3.5–5.2)
PROT SERPL-MCNC: 6.5 G/DL (ref 6–8.5)
PROT UR STRIP-MCNC: ABNORMAL MG/DL
RBC # BLD AUTO: 5.47 10*6/MM3 (ref 4.14–5.8)
RBC # UR STRIP: ABNORMAL /UL
SODIUM SERPL-SCNC: 140 MMOL/L (ref 136–145)
SP GR UR: 1.01 (ref 1–1.03)
UROBILINOGEN UR QL: NORMAL
WBC # BLD AUTO: 6.55 10*3/MM3 (ref 3.4–10.8)

## 2021-02-09 PROCEDURE — 36415 COLL VENOUS BLD VENIPUNCTURE: CPT | Performed by: FAMILY MEDICINE

## 2021-02-09 PROCEDURE — 85025 COMPLETE CBC W/AUTO DIFF WBC: CPT | Performed by: FAMILY MEDICINE

## 2021-02-09 PROCEDURE — 81003 URINALYSIS AUTO W/O SCOPE: CPT | Performed by: FAMILY MEDICINE

## 2021-02-09 PROCEDURE — 80053 COMPREHEN METABOLIC PANEL: CPT | Performed by: FAMILY MEDICINE

## 2021-02-09 PROCEDURE — 87086 URINE CULTURE/COLONY COUNT: CPT

## 2021-02-09 PROCEDURE — 99213 OFFICE O/P EST LOW 20 MIN: CPT | Performed by: FAMILY MEDICINE

## 2021-02-09 NOTE — PROGRESS NOTES
"Chief Complaint  blood in urine since Sunday    Terri Benoit presents to [facility]  History of Present Illness  Patient with obvious blood in his urine no pain no fever no sweats no chills no back pain gradually subside over the ensuing couple days  No specific trauma distant history of urine symptoms in 2018     Objective   Vital Signs:   /82 (BP Location: Right arm, Patient Position: Sitting, Cuff Size: Large Adult)   Pulse 78   Temp 96.8 °F (36 °C) (Temporal)   Ht 182.9 cm (72\")   Wt 119 kg (262 lb 12.8 oz)   SpO2 97%   BMI 35.64 kg/m²     Physical Exam  Constitutional:       Appearance: He is obese.   HENT:      Head: Normocephalic and atraumatic.   Eyes:      General: No scleral icterus.  Abdominal:      Tenderness: There is no abdominal tenderness. There is no right CVA tenderness or left CVA tenderness.   Neurological:      Mental Status: He is alert.   Psychiatric:         Mood and Affect: Mood normal.         Behavior: Behavior normal.         Thought Content: Thought content normal.         Judgment: Judgment normal.        Result Review :                 Assessment and Plan    Diagnoses and all orders for this visit:    1. Gross hematuria (Primary)  -     CBC & Differential  -     Comprehensive Metabolic Panel  -     Ambulatory Referral to Urology    Follow-up results of urine culture, medication pending    Follow Up   Return if symptoms worsen or fail to improve, for Recheck.  Patient was given instructions and counseling regarding his condition or for health maintenance advice. Please see specific information pulled into the AVS if appropriate.       "

## 2021-02-10 LAB — BACTERIA SPEC AEROBE CULT: NO GROWTH

## 2021-02-12 ENCOUNTER — TELEPHONE (OUTPATIENT)
Dept: INTERNAL MEDICINE | Facility: CLINIC | Age: 46
End: 2021-02-12

## 2021-02-12 NOTE — TELEPHONE ENCOUNTER
PATIENT CALLED TO CHECK THE STATUS OF A UROLOGY REFERRAL THAT DR. HIGGINS HAD SENT OUT.    PLEASE ADVISE  [phone]

## 2021-02-12 NOTE — TELEPHONE ENCOUNTER
Patient notified that he can call [facility] to schedule his appointment if he has not heard from them.. He said that he has spoken to them and has an appointment scheduled.

## 2021-02-24 ENCOUNTER — IMMUNIZATION (OUTPATIENT)
Dept: VACCINE CLINIC | Facility: HOSPITAL | Age: 46
End: 2021-02-24

## 2021-02-24 PROCEDURE — 91301 HC SARSCO02 VAC 100MCG/0.5ML IM: CPT | Performed by: OBSTETRICS & GYNECOLOGY

## 2021-02-24 PROCEDURE — 0012A: CPT | Performed by: OBSTETRICS & GYNECOLOGY

## 2021-03-24 RX ORDER — CITALOPRAM 10 MG/1
10 TABLET ORAL DAILY
Qty: 90 TABLET | Refills: 2 | Status: SHIPPED | OUTPATIENT
Start: 2021-03-24 | End: 2022-07-25 | Stop reason: SDUPTHER

## 2021-06-03 ENCOUNTER — TRANSCRIBE ORDERS (OUTPATIENT)
Dept: PREADMISSION TESTING | Facility: HOSPITAL | Age: 46
End: 2021-06-03

## 2021-06-03 DIAGNOSIS — Z01.818 OTHER SPECIFIED PRE-OPERATIVE EXAMINATION: Primary | ICD-10-CM

## 2021-06-08 ENCOUNTER — APPOINTMENT (OUTPATIENT)
Dept: PREADMISSION TESTING | Facility: HOSPITAL | Age: 46
End: 2021-06-08

## 2021-06-23 ENCOUNTER — APPOINTMENT (OUTPATIENT)
Dept: LAB | Facility: HOSPITAL | Age: 46
End: 2021-06-23

## 2021-06-28 ENCOUNTER — TELEPHONE (OUTPATIENT)
Dept: INTERNAL MEDICINE | Facility: CLINIC | Age: 46
End: 2021-06-28

## 2021-06-28 NOTE — TELEPHONE ENCOUNTER
PATIENT IS WANTING TO HAVE MEDICATION CALLED IN TO [facility] FOR RINGWORM.     PLEASE ADVISE   Lester Benoit (Self) [phone] (H)     PHARMACY   [id number] - [place], [place] - [address] AT [facility] - [phone]  - [phone]   [phone]

## 2021-07-28 ENCOUNTER — PRE-ADMISSION TESTING (OUTPATIENT)
Dept: PREADMISSION TESTING | Facility: HOSPITAL | Age: 46
End: 2021-07-28

## 2021-07-28 VITALS
BODY MASS INDEX: 35.78 KG/M2 | HEART RATE: 72 BPM | TEMPERATURE: 98.1 F | SYSTOLIC BLOOD PRESSURE: 124 MMHG | RESPIRATION RATE: 16 BRPM | OXYGEN SATURATION: 95 % | DIASTOLIC BLOOD PRESSURE: 79 MMHG | WEIGHT: 270 LBS | HEIGHT: 73 IN

## 2021-07-28 LAB
ANION GAP SERPL CALCULATED.3IONS-SCNC: 9.9 MMOL/L (ref 5–15)
BUN SERPL-MCNC: 12 MG/DL (ref 6–20)
BUN/CREAT SERPL: 13.8 (ref 7–25)
CALCIUM SPEC-SCNC: 9 MG/DL (ref 8.6–10.5)
CHLORIDE SERPL-SCNC: 106 MMOL/L (ref 98–107)
CO2 SERPL-SCNC: 24.1 MMOL/L (ref 22–29)
CREAT SERPL-MCNC: 0.87 MG/DL (ref 0.76–1.27)
DEPRECATED RDW RBC AUTO: 40.7 FL (ref 37–54)
ERYTHROCYTE [DISTWIDTH] IN BLOOD BY AUTOMATED COUNT: 13.4 % (ref 12.3–15.4)
GFR SERPL CREATININE-BSD FRML MDRD: 95 ML/MIN/1.73
GLUCOSE SERPL-MCNC: 89 MG/DL (ref 65–99)
HCT VFR BLD AUTO: 44.1 % (ref 37.5–51)
HGB BLD-MCNC: 15 G/DL (ref 13–17.7)
MCH RBC QN AUTO: 28.4 PG (ref 26.6–33)
MCHC RBC AUTO-ENTMCNC: 34 G/DL (ref 31.5–35.7)
MCV RBC AUTO: 83.5 FL (ref 79–97)
PLATELET # BLD AUTO: 205 10*3/MM3 (ref 140–450)
PMV BLD AUTO: 10.3 FL (ref 6–12)
POTASSIUM SERPL-SCNC: 4 MMOL/L (ref 3.5–5.2)
RBC # BLD AUTO: 5.28 10*6/MM3 (ref 4.14–5.8)
SARS-COV-2 ORF1AB RESP QL NAA+PROBE: NOT DETECTED
SODIUM SERPL-SCNC: 140 MMOL/L (ref 136–145)
WBC # BLD AUTO: 6.47 10*3/MM3 (ref 3.4–10.8)

## 2021-07-28 PROCEDURE — U0004 COV-19 TEST NON-CDC HGH THRU: HCPCS

## 2021-07-28 PROCEDURE — 85027 COMPLETE CBC AUTOMATED: CPT

## 2021-07-28 PROCEDURE — 36415 COLL VENOUS BLD VENIPUNCTURE: CPT

## 2021-07-28 PROCEDURE — C9803 HOPD COVID-19 SPEC COLLECT: HCPCS

## 2021-07-28 PROCEDURE — 80048 BASIC METABOLIC PNL TOTAL CA: CPT

## 2021-07-28 RX ORDER — OMEGA-3 FATTY ACIDS/FISH OIL 300-1000MG
200 CAPSULE ORAL
COMMUNITY
End: 2021-07-28

## 2021-07-28 RX ORDER — METHENAMINE, SODIUM PHOSPHATE, MONOBASIC, MONOHYDRATE, PHENYL SALICYLATE, METHYLENE BLUE, AND HYOSCYAMINE SULFATE 120; 40.8; 36; 10; .12 MG/1; MG/1; MG/1; MG/1; MG/1
1 CAPSULE ORAL
COMMUNITY
Start: 2021-02-17 | End: 2021-07-28

## 2021-07-28 RX ORDER — METHENAMINE, SODIUM PHOSPHATE, MONOBASIC, MONOHYDRATE, PHENYL SALICYLATE, METHYLENE BLUE, AND HYOSCYAMINE SULFATE 120; 40.8; 36; 10; .12 MG/1; MG/1; MG/1; MG/1; MG/1
118 CAPSULE ORAL
COMMUNITY
Start: 2021-04-19 | End: 2021-07-28

## 2021-07-30 ENCOUNTER — ANESTHESIA (OUTPATIENT)
Dept: PERIOP | Facility: HOSPITAL | Age: 46
End: 2021-07-30

## 2021-07-30 ENCOUNTER — ANESTHESIA EVENT (OUTPATIENT)
Dept: PERIOP | Facility: HOSPITAL | Age: 46
End: 2021-07-30

## 2021-07-30 ENCOUNTER — HOSPITAL ENCOUNTER (OUTPATIENT)
Facility: HOSPITAL | Age: 46
Discharge: HOME | End: 2021-07-31
Attending: UROLOGY | Admitting: UROLOGY

## 2021-07-30 DIAGNOSIS — N28.89 RENAL MASS, RIGHT: Primary | ICD-10-CM

## 2021-07-30 DIAGNOSIS — N28.89 RIGHT KIDNEY MASS: ICD-10-CM

## 2021-07-30 PROCEDURE — 25010000002 MIDAZOLAM PER 1 MG: Performed by: ANESTHESIOLOGY

## 2021-07-30 PROCEDURE — 25010000002 DEXAMETHASONE PER 1 MG: Performed by: NURSE ANESTHETIST, CERTIFIED REGISTERED

## 2021-07-30 PROCEDURE — 25010000002 HYDROMORPHONE PER 4 MG: Performed by: NURSE ANESTHETIST, CERTIFIED REGISTERED

## 2021-07-30 PROCEDURE — C1889 IMPLANT/INSERT DEVICE, NOC: HCPCS | Performed by: UROLOGY

## 2021-07-30 PROCEDURE — 25010000002 ONDANSETRON PER 1 MG: Performed by: NURSE ANESTHETIST, CERTIFIED REGISTERED

## 2021-07-30 PROCEDURE — 25010000002 CEFAZOLIN PER 500 MG: Performed by: UROLOGY

## 2021-07-30 PROCEDURE — 88307 TISSUE EXAM BY PATHOLOGIST: CPT | Performed by: UROLOGY

## 2021-07-30 PROCEDURE — G0378 HOSPITAL OBSERVATION PER HR: HCPCS

## 2021-07-30 PROCEDURE — 25010000003 CEFAZOLIN IN DEXTROSE 2-4 GM/100ML-% SOLUTION: Performed by: UROLOGY

## 2021-07-30 PROCEDURE — 88341 IMHCHEM/IMCYTCHM EA ADD ANTB: CPT | Performed by: UROLOGY

## 2021-07-30 PROCEDURE — 25010000003 CEFAZOLIN IN DEXTROSE 2-4 GM/100ML-% SOLUTION: Performed by: NURSE ANESTHETIST, CERTIFIED REGISTERED

## 2021-07-30 PROCEDURE — 25010000002 FENTANYL CITRATE (PF) 50 MCG/ML SOLUTION: Performed by: NURSE ANESTHETIST, CERTIFIED REGISTERED

## 2021-07-30 PROCEDURE — 25010000002 NEOSTIGMINE 5 MG/10ML SOLUTION: Performed by: NURSE ANESTHETIST, CERTIFIED REGISTERED

## 2021-07-30 PROCEDURE — 88331 PATH CONSLTJ SURG 1 BLK 1SPC: CPT | Performed by: UROLOGY

## 2021-07-30 PROCEDURE — 25010000002 MIDAZOLAM PER 1 MG: Performed by: NURSE ANESTHETIST, CERTIFIED REGISTERED

## 2021-07-30 PROCEDURE — 88342 IMHCHEM/IMCYTCHM 1ST ANTB: CPT | Performed by: UROLOGY

## 2021-07-30 PROCEDURE — 25010000002 PROPOFOL 10 MG/ML EMULSION: Performed by: NURSE ANESTHETIST, CERTIFIED REGISTERED

## 2021-07-30 DEVICE — FLOSEAL HEMOSTATIC MATRIX, 10ML
Type: IMPLANTABLE DEVICE | Site: ABDOMEN | Status: FUNCTIONAL
Brand: FLOSEAL HEMOSTATIC MATRIX

## 2021-07-30 RX ORDER — PROMETHAZINE HYDROCHLORIDE 25 MG/1
25 TABLET ORAL ONCE AS NEEDED
Status: DISCONTINUED | OUTPATIENT
Start: 2021-07-30 | End: 2021-07-30

## 2021-07-30 RX ORDER — OXYCODONE AND ACETAMINOPHEN 10; 325 MG/1; MG/1
1 TABLET ORAL EVERY 4 HOURS PRN
Status: DISCONTINUED | OUTPATIENT
Start: 2021-07-30 | End: 2021-07-30

## 2021-07-30 RX ORDER — FENTANYL CITRATE 50 UG/ML
50 INJECTION, SOLUTION INTRAMUSCULAR; INTRAVENOUS
Status: DISCONTINUED | OUTPATIENT
Start: 2021-07-30 | End: 2021-07-30 | Stop reason: HOSPADM

## 2021-07-30 RX ORDER — MAGNESIUM HYDROXIDE 1200 MG/15ML
LIQUID ORAL AS NEEDED
Status: DISCONTINUED | OUTPATIENT
Start: 2021-07-30 | End: 2021-07-30 | Stop reason: HOSPADM

## 2021-07-30 RX ORDER — SODIUM CHLORIDE 0.9 % (FLUSH) 0.9 %
3-10 SYRINGE (ML) INJECTION AS NEEDED
Status: DISCONTINUED | OUTPATIENT
Start: 2021-07-30 | End: 2021-07-30 | Stop reason: HOSPADM

## 2021-07-30 RX ORDER — BUPIVACAINE HYDROCHLORIDE 2.5 MG/ML
INJECTION, SOLUTION EPIDURAL; INFILTRATION; INTRACAUDAL AS NEEDED
Status: DISCONTINUED | OUTPATIENT
Start: 2021-07-30 | End: 2021-07-30 | Stop reason: HOSPADM

## 2021-07-30 RX ORDER — LIDOCAINE HYDROCHLORIDE 20 MG/ML
INJECTION, SOLUTION INFILTRATION; PERINEURAL AS NEEDED
Status: DISCONTINUED | OUTPATIENT
Start: 2021-07-30 | End: 2021-07-30 | Stop reason: SURG

## 2021-07-30 RX ORDER — SODIUM CHLORIDE 9 MG/ML
100 INJECTION, SOLUTION INTRAVENOUS CONTINUOUS
Status: DISCONTINUED | OUTPATIENT
Start: 2021-07-30 | End: 2021-07-31 | Stop reason: HOSPADM

## 2021-07-30 RX ORDER — HYDROMORPHONE HYDROCHLORIDE 1 MG/ML
0.5 INJECTION, SOLUTION INTRAMUSCULAR; INTRAVENOUS; SUBCUTANEOUS
Status: DISCONTINUED | OUTPATIENT
Start: 2021-07-30 | End: 2021-07-30

## 2021-07-30 RX ORDER — OXYCODONE AND ACETAMINOPHEN 7.5; 325 MG/1; MG/1
1 TABLET ORAL EVERY 4 HOURS PRN
Qty: 30 TABLET | Refills: 0 | Status: SHIPPED | OUTPATIENT
Start: 2021-07-30 | End: 2021-08-06

## 2021-07-30 RX ORDER — NALOXONE HCL 0.4 MG/ML
0.1 VIAL (ML) INJECTION
Status: DISCONTINUED | OUTPATIENT
Start: 2021-07-30 | End: 2021-07-31 | Stop reason: HOSPADM

## 2021-07-30 RX ORDER — HYDROMORPHONE HYDROCHLORIDE 1 MG/ML
0.5 INJECTION, SOLUTION INTRAMUSCULAR; INTRAVENOUS; SUBCUTANEOUS
Status: DISCONTINUED | OUTPATIENT
Start: 2021-07-30 | End: 2021-07-31 | Stop reason: HOSPADM

## 2021-07-30 RX ORDER — ACETAMINOPHEN 500 MG
1000 TABLET ORAL ONCE
Status: COMPLETED | OUTPATIENT
Start: 2021-07-30 | End: 2021-07-30

## 2021-07-30 RX ORDER — FLUMAZENIL 0.1 MG/ML
0.2 INJECTION INTRAVENOUS AS NEEDED
Status: DISCONTINUED | OUTPATIENT
Start: 2021-07-30 | End: 2021-07-30

## 2021-07-30 RX ORDER — SODIUM CHLORIDE, SODIUM LACTATE, POTASSIUM CHLORIDE, CALCIUM CHLORIDE 600; 310; 30; 20 MG/100ML; MG/100ML; MG/100ML; MG/100ML
9 INJECTION, SOLUTION INTRAVENOUS CONTINUOUS
Status: DISCONTINUED | OUTPATIENT
Start: 2021-07-30 | End: 2021-07-30

## 2021-07-30 RX ORDER — SODIUM CHLORIDE 0.9 % (FLUSH) 0.9 %
3 SYRINGE (ML) INJECTION EVERY 12 HOURS SCHEDULED
Status: DISCONTINUED | OUTPATIENT
Start: 2021-07-30 | End: 2021-07-31 | Stop reason: HOSPADM

## 2021-07-30 RX ORDER — PANTOPRAZOLE SODIUM 40 MG/1
40 TABLET, DELAYED RELEASE ORAL EVERY MORNING
Status: DISCONTINUED | OUTPATIENT
Start: 2021-07-31 | End: 2021-07-31 | Stop reason: HOSPADM

## 2021-07-30 RX ORDER — GLYCOPYRROLATE 0.2 MG/ML
INJECTION INTRAMUSCULAR; INTRAVENOUS AS NEEDED
Status: DISCONTINUED | OUTPATIENT
Start: 2021-07-30 | End: 2021-07-30 | Stop reason: SURG

## 2021-07-30 RX ORDER — PROMETHAZINE HYDROCHLORIDE 25 MG/1
25 SUPPOSITORY RECTAL ONCE AS NEEDED
Status: DISCONTINUED | OUTPATIENT
Start: 2021-07-30 | End: 2021-07-30

## 2021-07-30 RX ORDER — MIDAZOLAM HYDROCHLORIDE 1 MG/ML
1 INJECTION INTRAMUSCULAR; INTRAVENOUS
Status: DISCONTINUED | OUTPATIENT
Start: 2021-07-30 | End: 2021-07-30 | Stop reason: HOSPADM

## 2021-07-30 RX ORDER — FENTANYL CITRATE 50 UG/ML
50 INJECTION, SOLUTION INTRAMUSCULAR; INTRAVENOUS
Status: DISCONTINUED | OUTPATIENT
Start: 2021-07-30 | End: 2021-07-30

## 2021-07-30 RX ORDER — CEFAZOLIN SODIUM IN 0.9 % NACL 3 G/100 ML
3 INTRAVENOUS SOLUTION, PIGGYBACK (ML) INTRAVENOUS EVERY 8 HOURS
Status: COMPLETED | OUTPATIENT
Start: 2021-07-30 | End: 2021-07-31

## 2021-07-30 RX ORDER — MIDAZOLAM HYDROCHLORIDE 1 MG/ML
INJECTION INTRAMUSCULAR; INTRAVENOUS AS NEEDED
Status: DISCONTINUED | OUTPATIENT
Start: 2021-07-30 | End: 2021-07-30 | Stop reason: SURG

## 2021-07-30 RX ORDER — ONDANSETRON 2 MG/ML
4 INJECTION INTRAMUSCULAR; INTRAVENOUS ONCE AS NEEDED
Status: DISCONTINUED | OUTPATIENT
Start: 2021-07-30 | End: 2021-07-30

## 2021-07-30 RX ORDER — FENTANYL CITRATE 50 UG/ML
INJECTION, SOLUTION INTRAMUSCULAR; INTRAVENOUS AS NEEDED
Status: DISCONTINUED | OUTPATIENT
Start: 2021-07-30 | End: 2021-07-30 | Stop reason: SURG

## 2021-07-30 RX ORDER — PROPOFOL 10 MG/ML
VIAL (ML) INTRAVENOUS AS NEEDED
Status: DISCONTINUED | OUTPATIENT
Start: 2021-07-30 | End: 2021-07-30 | Stop reason: SURG

## 2021-07-30 RX ORDER — SODIUM CHLORIDE, SODIUM LACTATE, POTASSIUM CHLORIDE, CALCIUM CHLORIDE 600; 310; 30; 20 MG/100ML; MG/100ML; MG/100ML; MG/100ML
INJECTION, SOLUTION INTRAVENOUS CONTINUOUS PRN
Status: DISCONTINUED | OUTPATIENT
Start: 2021-07-30 | End: 2021-07-30 | Stop reason: SURG

## 2021-07-30 RX ORDER — CEFAZOLIN SODIUM 2 G/100ML
2 INJECTION, SOLUTION INTRAVENOUS ONCE
Status: COMPLETED | OUTPATIENT
Start: 2021-07-30 | End: 2021-07-30

## 2021-07-30 RX ORDER — KETAMINE HYDROCHLORIDE 10 MG/ML
INJECTION INTRAMUSCULAR; INTRAVENOUS AS NEEDED
Status: DISCONTINUED | OUTPATIENT
Start: 2021-07-30 | End: 2021-07-30 | Stop reason: SURG

## 2021-07-30 RX ORDER — ONDANSETRON 4 MG/1
4 TABLET, FILM COATED ORAL EVERY 6 HOURS PRN
Status: DISCONTINUED | OUTPATIENT
Start: 2021-07-30 | End: 2021-07-31 | Stop reason: HOSPADM

## 2021-07-30 RX ORDER — LIDOCAINE HYDROCHLORIDE 40 MG/ML
SOLUTION TOPICAL AS NEEDED
Status: DISCONTINUED | OUTPATIENT
Start: 2021-07-30 | End: 2021-07-30 | Stop reason: SURG

## 2021-07-30 RX ORDER — IBUPROFEN 600 MG/1
600 TABLET ORAL ONCE AS NEEDED
Status: DISCONTINUED | OUTPATIENT
Start: 2021-07-30 | End: 2021-07-30

## 2021-07-30 RX ORDER — SODIUM CHLORIDE 0.9 % (FLUSH) 0.9 %
3 SYRINGE (ML) INJECTION EVERY 12 HOURS SCHEDULED
Status: DISCONTINUED | OUTPATIENT
Start: 2021-07-30 | End: 2021-07-30 | Stop reason: HOSPADM

## 2021-07-30 RX ORDER — ONDANSETRON 2 MG/ML
INJECTION INTRAMUSCULAR; INTRAVENOUS AS NEEDED
Status: DISCONTINUED | OUTPATIENT
Start: 2021-07-30 | End: 2021-07-30 | Stop reason: SURG

## 2021-07-30 RX ORDER — ONDANSETRON 2 MG/ML
4 INJECTION INTRAMUSCULAR; INTRAVENOUS EVERY 6 HOURS PRN
Status: DISCONTINUED | OUTPATIENT
Start: 2021-07-30 | End: 2021-07-31 | Stop reason: HOSPADM

## 2021-07-30 RX ORDER — DEXAMETHASONE SODIUM PHOSPHATE 10 MG/ML
INJECTION INTRAMUSCULAR; INTRAVENOUS AS NEEDED
Status: DISCONTINUED | OUTPATIENT
Start: 2021-07-30 | End: 2021-07-30 | Stop reason: SURG

## 2021-07-30 RX ORDER — DIPHENHYDRAMINE HCL 25 MG
25 CAPSULE ORAL
Status: DISCONTINUED | OUTPATIENT
Start: 2021-07-30 | End: 2021-07-30

## 2021-07-30 RX ORDER — NEOSTIGMINE METHYLSULFATE 0.5 MG/ML
INJECTION, SOLUTION INTRAVENOUS AS NEEDED
Status: DISCONTINUED | OUTPATIENT
Start: 2021-07-30 | End: 2021-07-30 | Stop reason: SURG

## 2021-07-30 RX ORDER — GABAPENTIN 300 MG/1
600 CAPSULE ORAL ONCE
Status: COMPLETED | OUTPATIENT
Start: 2021-07-30 | End: 2021-07-30

## 2021-07-30 RX ORDER — NALOXONE HCL 0.4 MG/ML
0.2 VIAL (ML) INJECTION AS NEEDED
Status: DISCONTINUED | OUTPATIENT
Start: 2021-07-30 | End: 2021-07-30

## 2021-07-30 RX ORDER — SODIUM CHLORIDE 0.9 % (FLUSH) 0.9 %
10 SYRINGE (ML) INJECTION AS NEEDED
Status: DISCONTINUED | OUTPATIENT
Start: 2021-07-30 | End: 2021-07-31 | Stop reason: HOSPADM

## 2021-07-30 RX ORDER — CITALOPRAM 10 MG/1
10 TABLET ORAL DAILY
Status: DISCONTINUED | OUTPATIENT
Start: 2021-07-30 | End: 2021-07-31 | Stop reason: HOSPADM

## 2021-07-30 RX ORDER — ROCURONIUM BROMIDE 10 MG/ML
INJECTION, SOLUTION INTRAVENOUS AS NEEDED
Status: DISCONTINUED | OUTPATIENT
Start: 2021-07-30 | End: 2021-07-30 | Stop reason: SURG

## 2021-07-30 RX ORDER — FAMOTIDINE 10 MG/ML
20 INJECTION, SOLUTION INTRAVENOUS ONCE
Status: COMPLETED | OUTPATIENT
Start: 2021-07-30 | End: 2021-07-30

## 2021-07-30 RX ORDER — CEFAZOLIN SODIUM 2 G/100ML
INJECTION, SOLUTION INTRAVENOUS AS NEEDED
Status: DISCONTINUED | OUTPATIENT
Start: 2021-07-30 | End: 2021-07-30 | Stop reason: SURG

## 2021-07-30 RX ORDER — EPHEDRINE SULFATE 50 MG/ML
5 INJECTION, SOLUTION INTRAVENOUS ONCE AS NEEDED
Status: DISCONTINUED | OUTPATIENT
Start: 2021-07-30 | End: 2021-07-30

## 2021-07-30 RX ORDER — HYDROCODONE BITARTRATE AND ACETAMINOPHEN 7.5; 325 MG/1; MG/1
1 TABLET ORAL ONCE AS NEEDED
Status: DISCONTINUED | OUTPATIENT
Start: 2021-07-30 | End: 2021-07-30

## 2021-07-30 RX ORDER — SODIUM CHLORIDE 9 MG/ML
INJECTION, SOLUTION INTRAVENOUS AS NEEDED
Status: DISCONTINUED | OUTPATIENT
Start: 2021-07-30 | End: 2021-07-30 | Stop reason: HOSPADM

## 2021-07-30 RX ORDER — LIDOCAINE HYDROCHLORIDE 10 MG/ML
0.5 INJECTION, SOLUTION EPIDURAL; INFILTRATION; INTRACAUDAL; PERINEURAL ONCE AS NEEDED
Status: DISCONTINUED | OUTPATIENT
Start: 2021-07-30 | End: 2021-07-30 | Stop reason: HOSPADM

## 2021-07-30 RX ORDER — OXYCODONE AND ACETAMINOPHEN 7.5; 325 MG/1; MG/1
1 TABLET ORAL EVERY 4 HOURS PRN
Status: DISCONTINUED | OUTPATIENT
Start: 2021-07-30 | End: 2021-07-31 | Stop reason: HOSPADM

## 2021-07-30 RX ORDER — AMOXICILLIN 250 MG
2 CAPSULE ORAL 2 TIMES DAILY
Status: DISCONTINUED | OUTPATIENT
Start: 2021-07-30 | End: 2021-07-31 | Stop reason: HOSPADM

## 2021-07-30 RX ORDER — HYDRALAZINE HYDROCHLORIDE 20 MG/ML
5 INJECTION INTRAMUSCULAR; INTRAVENOUS
Status: DISCONTINUED | OUTPATIENT
Start: 2021-07-30 | End: 2021-07-30

## 2021-07-30 RX ORDER — DIPHENHYDRAMINE HYDROCHLORIDE 50 MG/ML
12.5 INJECTION INTRAMUSCULAR; INTRAVENOUS
Status: DISCONTINUED | OUTPATIENT
Start: 2021-07-30 | End: 2021-07-30

## 2021-07-30 RX ORDER — LABETALOL HYDROCHLORIDE 5 MG/ML
5 INJECTION, SOLUTION INTRAVENOUS
Status: DISCONTINUED | OUTPATIENT
Start: 2021-07-30 | End: 2021-07-30

## 2021-07-30 RX ADMIN — OXYCODONE AND ACETAMINOPHEN 1 TABLET: 7.5; 325 TABLET ORAL at 21:21

## 2021-07-30 RX ADMIN — ONDANSETRON 4 MG: 2 INJECTION INTRAMUSCULAR; INTRAVENOUS at 15:27

## 2021-07-30 RX ADMIN — KETAMINE HYDROCHLORIDE 30 MG: 10 INJECTION INTRAMUSCULAR; INTRAVENOUS at 14:41

## 2021-07-30 RX ADMIN — HYDROMORPHONE HYDROCHLORIDE 0.5 MG: 1 INJECTION, SOLUTION INTRAMUSCULAR; INTRAVENOUS; SUBCUTANEOUS at 16:25

## 2021-07-30 RX ADMIN — CITALOPRAM 10 MG: 10 TABLET, FILM COATED ORAL at 21:21

## 2021-07-30 RX ADMIN — FAMOTIDINE 20 MG: 10 INJECTION INTRAVENOUS at 13:11

## 2021-07-30 RX ADMIN — CEFAZOLIN SODIUM 2 G: 2 INJECTION, SOLUTION INTRAVENOUS at 14:34

## 2021-07-30 RX ADMIN — GLYCOPYRROLATE 0.6 MG: 0.2 INJECTION INTRAMUSCULAR; INTRAVENOUS at 15:28

## 2021-07-30 RX ADMIN — LIDOCAINE HYDROCHLORIDE 80 MG: 20 INJECTION, SOLUTION INFILTRATION; PERINEURAL at 14:16

## 2021-07-30 RX ADMIN — FENTANYL CITRATE 100 MCG: 50 INJECTION INTRAMUSCULAR; INTRAVENOUS at 14:16

## 2021-07-30 RX ADMIN — CEFAZOLIN SODIUM 2 G: 2 INJECTION, SOLUTION INTRAVENOUS at 14:01

## 2021-07-30 RX ADMIN — OXYCODONE AND ACETAMINOPHEN 1 TABLET: 325; 10 TABLET ORAL at 17:14

## 2021-07-30 RX ADMIN — CEFAZOLIN SODIUM 3 G: 10 INJECTION, POWDER, FOR SOLUTION INTRAVENOUS at 21:21

## 2021-07-30 RX ADMIN — FENTANYL CITRATE 50 MCG: 50 INJECTION, SOLUTION INTRAMUSCULAR; INTRAVENOUS at 16:19

## 2021-07-30 RX ADMIN — SODIUM CHLORIDE, PRESERVATIVE FREE 3 ML: 5 INJECTION INTRAVENOUS at 21:23

## 2021-07-30 RX ADMIN — DOCUSATE SODIUM 50MG AND SENNOSIDES 8.6MG 2 TABLET: 8.6; 5 TABLET, FILM COATED ORAL at 21:21

## 2021-07-30 RX ADMIN — GABAPENTIN 600 MG: 300 CAPSULE ORAL at 13:11

## 2021-07-30 RX ADMIN — SODIUM CHLORIDE 100 ML/HR: 9 INJECTION, SOLUTION INTRAVENOUS at 21:22

## 2021-07-30 RX ADMIN — ROCURONIUM BROMIDE 50 MG: 50 INJECTION INTRAVENOUS at 14:16

## 2021-07-30 RX ADMIN — MIDAZOLAM 1 MG: 1 INJECTION INTRAMUSCULAR; INTRAVENOUS at 13:11

## 2021-07-30 RX ADMIN — SODIUM CHLORIDE, POTASSIUM CHLORIDE, SODIUM LACTATE AND CALCIUM CHLORIDE: 600; 310; 30; 20 INJECTION, SOLUTION INTRAVENOUS at 12:53

## 2021-07-30 RX ADMIN — PROPOFOL 150 MG: 10 INJECTION, EMULSION INTRAVENOUS at 14:16

## 2021-07-30 RX ADMIN — SODIUM CHLORIDE, POTASSIUM CHLORIDE, SODIUM LACTATE AND CALCIUM CHLORIDE 9 ML/HR: 600; 310; 30; 20 INJECTION, SOLUTION INTRAVENOUS at 13:12

## 2021-07-30 RX ADMIN — SODIUM CHLORIDE, POTASSIUM CHLORIDE, SODIUM LACTATE AND CALCIUM CHLORIDE: 600; 310; 30; 20 INJECTION, SOLUTION INTRAVENOUS at 15:50

## 2021-07-30 RX ADMIN — DEXAMETHASONE SODIUM PHOSPHATE 8 MG: 10 INJECTION INTRAMUSCULAR; INTRAVENOUS at 14:41

## 2021-07-30 RX ADMIN — MIDAZOLAM 2 MG: 1 INJECTION INTRAMUSCULAR; INTRAVENOUS at 14:07

## 2021-07-30 RX ADMIN — ACETAMINOPHEN 1000 MG: 500 TABLET, FILM COATED ORAL at 13:11

## 2021-07-30 RX ADMIN — LIDOCAINE HYDROCHLORIDE 1 EACH: 40 SOLUTION TOPICAL at 14:18

## 2021-07-30 RX ADMIN — NEOSTIGMINE METHYLSULFATE 3 MG: 0.5 INJECTION INTRAVENOUS at 15:28

## 2021-07-30 RX ADMIN — HYDROMORPHONE HYDROCHLORIDE 0.5 MG: 1 INJECTION, SOLUTION INTRAMUSCULAR; INTRAVENOUS; SUBCUTANEOUS at 16:35

## 2021-07-30 NOTE — ANESTHESIA POSTPROCEDURE EVALUATION
"Patient: Lester Benoit    Procedure Summary     Date: 07/30/21 Room / Location: [facility] OR [address] [place] [address] MAIN OR    Anesthesia Start: 1405 Anesthesia Stop: 1556    Procedure: RIGHT  LAPAROSCOPIC  PARTIAL NEPHRECTOMY (Right Abdomen) Diagnosis:       Renal mass      (Right renal mass)    Surgeons: Frandy Palomares MD Provider: Sunday Romero MD    Anesthesia Type: general ASA Status: 2          Anesthesia Type: general    Vitals  Vitals Value Taken Time   /62 07/30/21 1645   Temp 36.6 °C (97.9 °F) 07/30/21 1554   Pulse 60 07/30/21 1647   Resp 16 07/30/21 1615   SpO2 96 % 07/30/21 1647   Vitals shown include unvalidated device data.        Post Anesthesia Care and Evaluation    Patient location during evaluation: PACU  Patient participation: complete - patient participated  Level of consciousness: awake and alert  Pain management: adequate  Airway patency: patent  Anesthetic complications: No anesthetic complications    Cardiovascular status: acceptable  Respiratory status: acceptable  Hydration status: acceptable    Comments: /60   Pulse 55   Temp 36.6 °C (97.9 °F) (Oral)   Resp 16   Ht 185.4 cm (73\")   Wt 121 kg (266 lb)   SpO2 98%   BMI 35.09 kg/m²         "

## 2021-07-30 NOTE — ANESTHESIA PREPROCEDURE EVALUATION
Anesthesia Evaluation     Patient summary reviewed and Nursing notes reviewed   NPO Solid Status: > 8 hours  NPO Liquid Status: > 2 hours           Airway   Mallampati: I  TM distance: >3 FB  Neck ROM: full  No difficulty expected and Large neck circumference  Dental - normal exam     Pulmonary - negative pulmonary ROS and normal exam   Cardiovascular - negative cardio ROS and normal exam  Exercise tolerance: good (4-7 METS)        Neuro/Psych  (+) psychiatric history,     GI/Hepatic/Renal/Endo    (+) morbid obesity, GERD well controlled,  renal disease,     Musculoskeletal (-) negative ROS    Abdominal   (+) obese,     Bowel sounds: normal.   Substance History - negative use     OB/GYN negative ob/gyn ROS         Other        ROS/Med Hx Other: H/O hematuria.     Rt Kidney mass.                  Anesthesia Plan    ASA 2     general     intravenous induction     Anesthetic plan, all risks, benefits, and alternatives have been provided, discussed and informed consent has been obtained with: patient.    Plan discussed with CRNA and attending.

## 2021-07-30 NOTE — ANESTHESIA PROCEDURE NOTES
Airway  Urgency: elective    Date/Time: 7/30/2021 2:18 PM  Airway not difficult    General Information and Staff    Patient location during procedure: OR  Anesthesiologist: Sunday Romero MD  CRNA: Edmundo Lewis CRNA    Indications and Patient Condition  Indications for airway management: airway protection    Preoxygenated: yes  MILS not maintained throughout  Mask difficulty assessment: 1 - vent by mask    Final Airway Details  Final airway type: endotracheal airway      Successful airway: ETT  Cuffed: yes   Successful intubation technique: direct laryngoscopy  Facilitating devices/methods: anterior pressure/BURP  Endotracheal tube insertion site: oral  Blade: Sofy  Blade size: 4  ETT size (mm): 7.5  Cormack-Lehane Classification: grade IIa - partial view of glottis  Placement verified by: chest auscultation   Cuff volume (mL): 8  Measured from: lips  ETT/EBT  to lips (cm): 22  Number of attempts at approach: 1  Assessment: lips, teeth, and gum same as pre-op and atraumatic intubation    Additional Comments  Pre O2, SIAI

## 2021-07-31 ENCOUNTER — READMISSION MANAGEMENT (OUTPATIENT)
Dept: CALL CENTER | Facility: HOSPITAL | Age: 46
End: 2021-07-31

## 2021-07-31 VITALS
WEIGHT: 266 LBS | SYSTOLIC BLOOD PRESSURE: 102 MMHG | HEIGHT: 73 IN | TEMPERATURE: 97.5 F | HEART RATE: 62 BPM | RESPIRATION RATE: 16 BRPM | DIASTOLIC BLOOD PRESSURE: 60 MMHG | OXYGEN SATURATION: 94 % | BODY MASS INDEX: 35.25 KG/M2

## 2021-07-31 LAB
ANION GAP SERPL CALCULATED.3IONS-SCNC: 8.3 MMOL/L (ref 5–15)
BUN SERPL-MCNC: 13 MG/DL (ref 6–20)
BUN/CREAT SERPL: 15.1 (ref 7–25)
CALCIUM SPEC-SCNC: 8.9 MG/DL (ref 8.6–10.5)
CHLORIDE SERPL-SCNC: 106 MMOL/L (ref 98–107)
CO2 SERPL-SCNC: 24.7 MMOL/L (ref 22–29)
CREAT SERPL-MCNC: 0.86 MG/DL (ref 0.76–1.27)
DEPRECATED RDW RBC AUTO: 39 FL (ref 37–54)
ERYTHROCYTE [DISTWIDTH] IN BLOOD BY AUTOMATED COUNT: 13 % (ref 12.3–15.4)
GFR SERPL CREATININE-BSD FRML MDRD: 96 ML/MIN/1.73
GLUCOSE SERPL-MCNC: 127 MG/DL (ref 65–99)
HCT VFR BLD AUTO: 39.8 % (ref 37.5–51)
HGB BLD-MCNC: 13.5 G/DL (ref 13–17.7)
MCH RBC QN AUTO: 28 PG (ref 26.6–33)
MCHC RBC AUTO-ENTMCNC: 33.9 G/DL (ref 31.5–35.7)
MCV RBC AUTO: 82.6 FL (ref 79–97)
PLATELET # BLD AUTO: 223 10*3/MM3 (ref 140–450)
PMV BLD AUTO: 11.1 FL (ref 6–12)
POTASSIUM SERPL-SCNC: 4.9 MMOL/L (ref 3.5–5.2)
RBC # BLD AUTO: 4.82 10*6/MM3 (ref 4.14–5.8)
SODIUM SERPL-SCNC: 139 MMOL/L (ref 136–145)
WBC # BLD AUTO: 14.3 10*3/MM3 (ref 3.4–10.8)

## 2021-07-31 PROCEDURE — G0378 HOSPITAL OBSERVATION PER HR: HCPCS

## 2021-07-31 PROCEDURE — 25010000002 HYDROMORPHONE PER 4 MG: Performed by: UROLOGY

## 2021-07-31 PROCEDURE — 85027 COMPLETE CBC AUTOMATED: CPT | Performed by: UROLOGY

## 2021-07-31 PROCEDURE — 80048 BASIC METABOLIC PNL TOTAL CA: CPT | Performed by: UROLOGY

## 2021-07-31 PROCEDURE — 25010000002 CEFAZOLIN PER 500 MG: Performed by: UROLOGY

## 2021-07-31 RX ORDER — BISACODYL 10 MG
10 SUPPOSITORY, RECTAL RECTAL ONCE
Status: DISCONTINUED | OUTPATIENT
Start: 2021-07-31 | End: 2021-07-31 | Stop reason: HOSPADM

## 2021-07-31 RX ADMIN — OXYCODONE AND ACETAMINOPHEN 1 TABLET: 7.5; 325 TABLET ORAL at 04:33

## 2021-07-31 RX ADMIN — CEFAZOLIN SODIUM 3 G: 10 INJECTION, POWDER, FOR SOLUTION INTRAVENOUS at 06:29

## 2021-07-31 RX ADMIN — SODIUM CHLORIDE 100 ML/HR: 9 INJECTION, SOLUTION INTRAVENOUS at 06:29

## 2021-07-31 RX ADMIN — PANTOPRAZOLE SODIUM 40 MG: 40 TABLET, DELAYED RELEASE ORAL at 06:29

## 2021-07-31 RX ADMIN — CITALOPRAM 10 MG: 10 TABLET, FILM COATED ORAL at 08:21

## 2021-07-31 RX ADMIN — DOCUSATE SODIUM 50MG AND SENNOSIDES 8.6MG 2 TABLET: 8.6; 5 TABLET, FILM COATED ORAL at 08:25

## 2021-07-31 RX ADMIN — OXYCODONE AND ACETAMINOPHEN 1 TABLET: 7.5; 325 TABLET ORAL at 08:29

## 2021-07-31 RX ADMIN — HYDROMORPHONE HYDROCHLORIDE 0.5 MG: 1 INJECTION, SOLUTION INTRAMUSCULAR; INTRAVENOUS; SUBCUTANEOUS at 01:54

## 2021-07-31 NOTE — OUTREACH NOTE
Prep Survey      Responses   Spirit[facility] facility patient discharged from?  [place]   Is LACE score < 7 ?  Yes   Emergency Room discharge w/ pulse ox?  No   Eligibility  [facility]    Date of Admission  07/30/21   Date of Discharge  07/31/21   Discharge Disposition  Home or Self Care   Discharge diagnosis  Right Renal Mass pathology pending   Does the patient have one of the following disease processes/diagnoses(primary or secondary)?  General Surgery   Does the patient have Home health ordered?  No   Is there a DME ordered?  No   Prep survey completed?  Yes          Bonny Pierce RN

## 2021-08-02 ENCOUNTER — TRANSITIONAL CARE MANAGEMENT TELEPHONE ENCOUNTER (OUTPATIENT)
Dept: CALL CENTER | Facility: HOSPITAL | Age: 46
End: 2021-08-02

## 2021-08-02 NOTE — OUTREACH NOTE
Call Center TCM Note      Responses   [facility] [place] patient discharged from?  [facility]   Does the patient have one of the following disease processes/diagnoses(primary or secondary)?  General Surgery   TCM attempt successful?  Yes   Call start time  1121   Call end time  1122   Discharge diagnosis  Right Renal Mass pathology pending   Meds reviewed with patient/caregiver?  Yes   Is the patient having any side effects they believe may be caused by any medication additions or changes?  No   Does the patient have all medications related to this admission filled (includes all antibiotics, pain medications, etc.)  Yes   Is the patient taking all medications as directed (includes completed medication regime)?  Yes   Does the patient have a follow up appointment scheduled with their surgeon?  Yes   Has the patient kept scheduled appointments due by today?  N/A   Has home health visited the patient within 72 hours of discharge?  N/A   Psychosocial issues?  No   Did the patient receive a copy of their discharge instructions?  Yes   Nursing interventions  Reviewed instructions with patient   What is the patient's perception of their health status since discharge?  Improving   Nursing interventions  Nurse provided patient education   Is the patient /caregiver able to teach back basic post-op care?  Continue use of incentive spirometry at least 1 week post discharge, Practice 'cough and deep breath', Drive as instructed by MD in discharge instructions, Take showers only when approved by MD-sponge bathe until then, No tub bath, swimming, or hot tub until instructed by MD, Keep incision areas clean,dry and protected, Do not remove steri-strips, Lifting as instructed by MD in discharge instructions   Is the patient/caregiver able to teach back signs and symptoms of incisional infection?  Fever   Is the patient/caregiver able to teach back the hierarchy of who to call/visit for symptoms/problems? PCP, Specialist, Home health  nurse, Urgent Care, ED, 911  Yes   TCM call completed?  Yes   Wrap up additional comments  Says he is doing well, no questions or concerns at this time, he will be following up with his surgeon.          Selena Clarke RN    8/2/2021, 11:22 EDT

## 2021-08-02 NOTE — OUTREACH NOTE
Call Center TCM Note      Responses   [facility] [place] patient discharged from?  [place]   Does the patient have one of the following disease processes/diagnoses(primary or secondary)?  General Surgery   TCM attempt successful?  No   Unsuccessful attempts  Attempt 1          Selena Clarke RN    8/2/2021, 09:12 EDT

## 2021-08-02 NOTE — PROGRESS NOTES
Case Management Discharge Note      Final Note: Discharged to home on 7/31/21. RORY Castillo RN, CCP.         Selected Continued Care - Discharged on 7/31/2021 Admission date: 7/30/2021 - Discharge disposition: Home or Self Care    Destination    No services have been selected for the patient.              Durable Medical Equipment    No services have been selected for the patient.              Dialysis/Infusion    No services have been selected for the patient.              Home Medical Care    No services have been selected for the patient.              Therapy    No services have been selected for the patient.              Community Resources    No services have been selected for the patient.              Community & DME    No services have been selected for the patient.                       Final Discharge Disposition Code: 01 - home or self-care

## 2021-08-03 LAB
CYTO UR: NORMAL
LAB AP CASE REPORT: NORMAL
LAB AP SPECIAL STAINS: NORMAL
LAB AP SYNOPTIC CHECKLIST: NORMAL
Lab: NORMAL
PATH REPORT.FINAL DX SPEC: NORMAL
PATH REPORT.GROSS SPEC: NORMAL

## 2021-08-04 PROBLEM — C64.1 RENAL CARCINOMA, RIGHT: Status: ACTIVE | Noted: 2021-08-04

## 2021-11-30 ENCOUNTER — OFFICE VISIT (OUTPATIENT)
Dept: INTERNAL MEDICINE | Facility: CLINIC | Age: 46
End: 2021-11-30

## 2021-11-30 VITALS
RESPIRATION RATE: 16 BRPM | SYSTOLIC BLOOD PRESSURE: 130 MMHG | WEIGHT: 266 LBS | DIASTOLIC BLOOD PRESSURE: 82 MMHG | BODY MASS INDEX: 35.25 KG/M2 | TEMPERATURE: 101.2 F | HEART RATE: 72 BPM | OXYGEN SATURATION: 96 % | HEIGHT: 73 IN

## 2021-11-30 DIAGNOSIS — R05.8 COUGH WITH EXPOSURE TO COVID-19 VIRUS: Primary | ICD-10-CM

## 2021-11-30 DIAGNOSIS — B34.9 ACUTE VIRAL SYNDROME: ICD-10-CM

## 2021-11-30 DIAGNOSIS — Z20.822 COUGH WITH EXPOSURE TO COVID-19 VIRUS: Primary | ICD-10-CM

## 2021-11-30 PROCEDURE — 99213 OFFICE O/P EST LOW 20 MIN: CPT | Performed by: FAMILY MEDICINE

## 2021-11-30 RX ORDER — DIAZEPAM 5 MG/1
TABLET ORAL
COMMUNITY
Start: 2021-11-24 | End: 2022-09-22

## 2021-11-30 NOTE — PROGRESS NOTES
11/30/2021    Patient Information  Lester Benoit                                                                                          [id number] [place]   [facility] [address]      Chief Complaint:   Chief Complaint   Patient presents with   • Fever     Congested, fatigued           History of Present Illness:  Pt is here for known covid exposure 3 -4days ago. He denies any symptoms. He reports he was covid vaccinated. He denies any feelings of fever though he has one in clinic today.  No further concerns/complaints.      Review of Systems   Constitutional: Negative.   HENT: Negative.    Eyes: Negative.    Cardiovascular: Negative.    Respiratory: Negative.    Endocrine: Negative.    Hematologic/Lymphatic: Negative.    Skin: Negative.    Musculoskeletal: Negative.    Gastrointestinal: Negative.    Genitourinary: Negative.    Neurological: Negative.    Psychiatric/Behavioral: Negative.    Allergic/Immunologic: Negative.        Active Problems:    Patient Active Problem List   Diagnosis   • Gastroesophageal reflux disease   • Seasonal allergic rhinitis   • Obesity (BMI 30-39.9)   • Dysphagia   • Eyelid lesion   • Hyperglycemia   • Calculus of gallbladder without biliary obstruction   • Asymptomatic microscopic hematuria   • Pharyngoesophageal dysphagia   • Esophagitis, eosinophilic   • Dysthymia   • Primary insomnia   • Gross hematuria   • Renal mass, right   • Renal carcinoma, right (HCC)         Past Medical History:   Diagnosis Date   • Anxiety    • Depression    • GERD (gastroesophageal reflux disease)    • History of kidney stones    • Right kidney mass          Past Surgical History:   Procedure Laterality Date   • ENDOSCOPY N/A 4/6/2018    Z-line irregular, 39 cm from the incisors, [place] Grade A esophagitis, esophageal mucosal changes suggestive of eosinophilic esophagitis. Dilated, gastritis, duodenitis, 1 cm hiatal hernia   • KIDNEY STONE SURGERY     • KNEE ACL RECONSTRUCTION  Right 2010   • NEPHRECTOMY PARTIAL Right 7/30/2021    Procedure: RIGHT  LAPAROSCOPIC  PARTIAL NEPHRECTOMY;  Surgeon: Frandy Palomares MD;  Location: [facility];  Service: Urology;  Laterality: Right;         Allergies   Allergen Reactions   • Iodinated Diagnostic Agents Anaphylaxis           Current Outpatient Medications:   •  citalopram (CeleXA) 10 MG tablet, TAKE 1 TABLET BY MOUTH EVERY DAY (Patient taking differently: Take 10 mg by mouth Every Morning.), Disp: 90 tablet, Rfl: 2  •  diazePAM (VALIUM) 5 MG tablet, , Disp: , Rfl:   •  esomeprazole (nexIUM) 40 MG capsule, TAKE 1 CAPSULE BY MOUTH EVERY DAY (Patient taking differently: Take 40 mg by mouth Every Morning Before Breakfast.), Disp: 90 capsule, Rfl: 3      Family History   Problem Relation Age of Onset   • Lung cancer Father 69   • Hypertension Mother    • Diabetes Paternal Grandmother    • Malig Hyperthermia Neg Hx          Social History     Socioeconomic History   • Marital status:      Spouse name: Miriam   • Number of children: 3   Tobacco Use   • Smoking status: Never Smoker   • Smokeless tobacco: Never Used   Vaping Use   • Vaping Use: Never used   Substance and Sexual Activity   • Alcohol use: Yes     Comment: RARELY   • Drug use: No   • Sexual activity: Yes         Physical Exam  Constitutional:       Appearance: Normal appearance.   Cardiovascular:      Rate and Rhythm: Normal rate and regular rhythm.      Pulses: Normal pulses.      Heart sounds: Normal heart sounds.   Pulmonary:      Effort: Pulmonary effort is normal.      Breath sounds: Normal breath sounds.   Skin:     General: Skin is warm.      Findings: No rash.   Neurological:      General: No focal deficit present.      Mental Status: He is alert and oriented to person, place, and time. Mental status is at baseline.   Psychiatric:         Mood and Affect: Mood normal.         Behavior: Behavior normal.         Thought Content: Thought content normal.         Judgment:  "Judgment normal.          Vitals:    11/30/21 1039   BP: 130/82   Pulse: 72   Resp: 16   Temp: (!) 101.2 °F (38.4 °C)   SpO2: 96%   Weight: 121 kg (266 lb)   Height: 185.4 cm (73\")       Body mass index is 35.09 kg/m².       Lab/other results:        Assessment/Plan:    Diagnoses and all orders for this visit:    1. Cough with exposure to COVID-19 virus (Primary)  -     COVID-19,[facility] ROUTINE, NP/OP SWAB IN TRANSPORT MEDIA OR ESWAB 72 HR TAT - Swab, Anterior nasal    2. Acute viral syndrome         F.u covid test. Pt refusing flu test and refusing tylenol in clinic. F/u in 1 wk if no improvement. Quarantine protocol explained to pt. Conservative management.      Procedures          "

## 2021-12-01 LAB
LABCORP SARS-COV-2, NAA 2 DAY TAT: NORMAL
SARS-COV-2 RNA RESP QL NAA+PROBE: NOT DETECTED

## 2021-12-27 RX ORDER — ESOMEPRAZOLE MAGNESIUM 40 MG/1
40 CAPSULE, DELAYED RELEASE ORAL DAILY
Qty: 90 CAPSULE | Refills: 3 | OUTPATIENT
Start: 2021-12-27

## 2022-01-03 ENCOUNTER — TELEPHONE (OUTPATIENT)
Dept: GASTROENTEROLOGY | Facility: CLINIC | Age: 47
End: 2022-01-03

## 2022-01-03 RX ORDER — ESOMEPRAZOLE MAGNESIUM 40 MG/1
40 CAPSULE, DELAYED RELEASE ORAL DAILY
Qty: 90 CAPSULE | Refills: 0 | Status: SHIPPED | OUTPATIENT
Start: 2022-01-03 | End: 2022-05-24 | Stop reason: SDUPTHER

## 2022-01-03 NOTE — TELEPHONE ENCOUNTER
Called pt and advised that we have sent his nexium 40mg po daily script to his [facility] pharmacy.  Pt verb understanding.

## 2022-01-03 NOTE — TELEPHONE ENCOUNTER
----- Message from Bernadette Tate sent at 1/3/2022 11:40 AM EST -----  Contact: [phone]  Pt is calling wanting to refill his prescription of esomeprazole (nexIUM) 40 MG capsule pt is wanting to know if you jenna will give him his meds out for 3 months until his apt which isn't until march 30th wants it sent to [facility] pharmacy.

## 2022-05-25 RX ORDER — ESOMEPRAZOLE MAGNESIUM 40 MG/1
40 CAPSULE, DELAYED RELEASE ORAL DAILY
Qty: 60 CAPSULE | Refills: 0 | Status: SHIPPED | OUTPATIENT
Start: 2022-05-25 | End: 2022-07-28 | Stop reason: SDUPTHER

## 2022-07-12 ENCOUNTER — OFFICE VISIT (OUTPATIENT)
Dept: GASTROENTEROLOGY | Facility: CLINIC | Age: 47
End: 2022-07-12

## 2022-07-12 VITALS
HEIGHT: 73 IN | WEIGHT: 282.2 LBS | SYSTOLIC BLOOD PRESSURE: 135 MMHG | TEMPERATURE: 96 F | BODY MASS INDEX: 37.4 KG/M2 | HEART RATE: 96 BPM | DIASTOLIC BLOOD PRESSURE: 86 MMHG

## 2022-07-12 DIAGNOSIS — K20.0 ESOPHAGITIS, EOSINOPHILIC: Chronic | ICD-10-CM

## 2022-07-12 DIAGNOSIS — K21.9 GASTROESOPHAGEAL REFLUX DISEASE, UNSPECIFIED WHETHER ESOPHAGITIS PRESENT: Primary | Chronic | ICD-10-CM

## 2022-07-12 DIAGNOSIS — Z12.11 COLON CANCER SCREENING: ICD-10-CM

## 2022-07-12 PROCEDURE — 99214 OFFICE O/P EST MOD 30 MIN: CPT | Performed by: INTERNAL MEDICINE

## 2022-07-12 RX ORDER — SODIUM CHLORIDE, SODIUM LACTATE, POTASSIUM CHLORIDE, CALCIUM CHLORIDE 600; 310; 30; 20 MG/100ML; MG/100ML; MG/100ML; MG/100ML
30 INJECTION, SOLUTION INTRAVENOUS CONTINUOUS
Status: CANCELLED | OUTPATIENT
Start: 2023-03-22

## 2022-07-12 NOTE — PROGRESS NOTES
Chief Complaint   Patient presents with   • Heartburn       Subjective     HPI    Lester Benoit is a 47 y.o. male with a past medical history noted below who presents for follow up of GERD and esophagitis.  He was last seen in August of 2019.      His EGD from 2018 showed mild gastritis and chronic esophagitis as well as eosinophilic esophagitis.    He has a contact dermatitis rash after swimming in a friend's pool    He is doing well with nexium, notices return of symptoms when he misses a few days of doses.  No dysphagia.      Had R nephrectomy last year, RCC    No family history of GI malignancies.      Bowel movements are, for the most part, normal.  He is due for screening colonoscopy.    Today's visit was in the office.  Both the patient and I were wearing face masks and proper hand hygiene was performed before and after the physical exam.           Current Outpatient Medications:   •  citalopram (CeleXA) 10 MG tablet, Take 1 tablet by mouth Daily., Disp: 90 tablet, Rfl: 2  •  esomeprazole (nexIUM) 40 MG capsule, Take 1 capsule by mouth Daily., Disp: 60 capsule, Rfl: 0  •  citalopram (CeleXA) 10 MG tablet, Take 1 tablet by mouth Daily., Disp: 90 tablet, Rfl: 2  •  diazePAM (VALIUM) 5 MG tablet, , Disp: , Rfl:       Objective     Vitals:    07/12/22 1508   BP: 135/86   Pulse: 96   Temp: 96 °F (35.6 °C)         07/12/22  1508   Weight: 128 kg (282 lb 3.2 oz)     Body mass index is 37.23 kg/m².    Physical Exam  Constitutional:       General: He is not in acute distress.  Pulmonary:      Effort: Pulmonary effort is normal.   Skin:     Comments: Contact dermatitis type rash on multiple areas--leg, right forehead, right abdomen   Neurological:      Mental Status: He is alert and oriented to person, place, and time.   Psychiatric:         Mood and Affect: Mood normal.         Behavior: Behavior normal.         Thought Content: Thought content normal.         Judgment: Judgment normal.             WBC   Date Value Ref  Range Status   07/31/2021 14.30 (H) 3.40 - 10.80 10*3/mm3 Final   02/17/2021 8.55 4.5 - 11.0 10*3/uL Final     RBC   Date Value Ref Range Status   07/31/2021 4.82 4.14 - 5.80 10*6/mm3 Final   02/17/2021 5.39 4.5 - 5.9 10*6/uL Final     Hemoglobin   Date Value Ref Range Status   07/31/2021 13.5 13.0 - 17.7 g/dL Final   02/17/2021 15.0 13.5 - 17.5 g/dL Final     Hematocrit   Date Value Ref Range Status   07/31/2021 39.8 37.5 - 51.0 % Final   02/17/2021 44.6 41.0 - 53.0 % Final     MCV   Date Value Ref Range Status   07/31/2021 82.6 79.0 - 97.0 fL Final   02/17/2021 82.7 80.0 - 100.0 fL Final     MCH   Date Value Ref Range Status   07/31/2021 28.0 26.6 - 33.0 pg Final   02/17/2021 27.8 26.0 - 34.0 pg Final     MCHC   Date Value Ref Range Status   07/31/2021 33.9 31.5 - 35.7 g/dL Final   02/17/2021 33.6 31.0 - 37.0 g/dL Final     RDW   Date Value Ref Range Status   07/31/2021 13.0 12.3 - 15.4 % Final   02/17/2021 13.2 12.0 - 16.8 % Final     RDW-SD   Date Value Ref Range Status   07/31/2021 39.0 37.0 - 54.0 fl Final     MPV   Date Value Ref Range Status   07/31/2021 11.1 6.0 - 12.0 fL Final   02/17/2021 10.4 8.4 - 12.4 fL Final     Platelets   Date Value Ref Range Status   07/31/2021 223 140 - 450 10*3/mm3 Final   02/17/2021 211 140 - 440 10*3/uL Final     Neutrophil Rel %   Date Value Ref Range Status   02/17/2021 61.7 45 - 80 % Final     Lymphocyte Rel %   Date Value Ref Range Status   02/17/2021 29.0 15 - 50 % Final     Monocyte Rel %   Date Value Ref Range Status   02/17/2021 7.6 0 - 15 % Final     Eosinophil %   Date Value Ref Range Status   02/17/2021 1.3 0 - 7 % Final     Basophil Rel %   Date Value Ref Range Status   02/17/2021 0.2 0 - 2 % Final     Immature Grans %   Date Value Ref Range Status   02/17/2021 0.2 0.0 - 1.0 % Final     Neutrophils Absolute   Date Value Ref Range Status   02/17/2021 5.27 2.0 - 8.8 10*3/uL Final     Lymphocytes Absolute   Date Value Ref Range Status   02/17/2021 2.48 0.7 - 5.5  10*3/uL Final     Monocytes Absolute   Date Value Ref Range Status   02/17/2021 0.65 0.0 - 1.7 10*3/uL Final     Eosinophils Absolute   Date Value Ref Range Status   02/17/2021 0.11 0.0 - 0.8 10*3/uL Final     Basophils Absolute   Date Value Ref Range Status   02/17/2021 0.02 0.0 - 0.2 10*3/uL Final     Immature Grans, Absolute   Date Value Ref Range Status   02/17/2021 0.02 0.00 - 0.10 10*3/uL Final     nRBC   Date Value Ref Range Status   02/17/2021 0 0 /100(WBC) Final   02/09/2021 0.0 0.0 - 0.2 /100 WBC Final       Lab Results   Component Value Date    GLUCOSE 127 (H) 07/31/2021    BUN 13 07/31/2021    CREATININE 0.86 07/31/2021    EGFRIFNONA 96 07/31/2021    EGFRIFAFRI 111 02/15/2018    BCR 15.1 07/31/2021    CO2 24.7 07/31/2021    CALCIUM 8.9 07/31/2021    PROTENTOTREF 6.5 02/15/2018    ALBUMIN 4.10 02/09/2021    LABIL2 1.7 02/15/2018    AST 25 02/09/2021    ALT 30 02/09/2021         Imaging Results (Last 7 Days)     ** No results found for the last 168 hours. **          I personally reviewed data as detailed below:     The labs listed above.    The radiology studies as follows: 2/15/2022 CT of [facility] abdomen [facility] scanned into the media tab, notable for fatty liver    Endoscopy procedures and pathology from: 4/6/18 EGD    Assessment and Plan         Diagnoses and all orders for this visit:    1. Gastroesophageal reflux disease, unspecified whether esophagitis present (Primary)  -     Case Request; Standing  -     COVID PRE-OP / PRE-PROCEDURE SCREENING ORDER (NO ISOLATION) - Swab, Nasopharynx; Future  -     Follow Anesthesia Guidelines / Standing Orders; Future  -     Obtain Informed Consent; Future  -     Case Request    2. Esophagitis, eosinophilic  -     Case Request; Standing  -     COVID PRE-OP / PRE-PROCEDURE SCREENING ORDER (NO ISOLATION) - Swab, Nasopharynx; Future  -     Follow Anesthesia Guidelines / Standing Orders; Future  -     Obtain Informed Consent; Future  -     Case  Request    3. Colon cancer screening  -     Case Request; Standing  -     COVID PRE-OP / PRE-PROCEDURE SCREENING ORDER (NO ISOLATION) - Swab, Nasopharynx; Future  -     Follow Anesthesia Guidelines / Standing Orders; Future  -     Obtain Informed Consent; Future  -     Case Request    Plan  Continue PPI  We will go ahead and update his EGD, colonoscopy at the same time for screening purposes  I advised evaluation for urgent care for his contact dermatitis type rash as he is wanting a steroid injection to help symptoms  Further recommendations after endoscopy      I have discussed the above plan with the patient.  They verbalize understanding and are in agreement with the plan.  They have been advised to contact the office for any questions, concerns, or changes related to their health.    Dictated utilizing Dragon dictation

## 2022-07-25 RX ORDER — CITALOPRAM 10 MG/1
10 TABLET ORAL DAILY
Qty: 90 TABLET | Refills: 0 | Status: SHIPPED | OUTPATIENT
Start: 2022-07-25 | End: 2022-10-18 | Stop reason: SDUPTHER

## 2022-07-28 RX ORDER — ESOMEPRAZOLE MAGNESIUM 40 MG/1
40 CAPSULE, DELAYED RELEASE ORAL DAILY
Qty: 90 CAPSULE | Refills: 0 | Status: SHIPPED | OUTPATIENT
Start: 2022-07-28 | End: 2022-11-23 | Stop reason: SDUPTHER

## 2022-09-20 ENCOUNTER — TELEPHONE (OUTPATIENT)
Dept: GASTROENTEROLOGY | Facility: CLINIC | Age: 47
End: 2022-09-20

## 2022-09-20 NOTE — TELEPHONE ENCOUNTER
Caller: Lester Benoit    Relationship to patient: Self    Best call back number: [phone]    Type of visit: COLONOSCOPY AND EGD    Requested date: NEXT AVALIABLE    Additional notes:PT CALLED TO SCHEDULE

## 2022-09-22 ENCOUNTER — OFFICE VISIT (OUTPATIENT)
Dept: INTERNAL MEDICINE | Facility: CLINIC | Age: 47
End: 2022-09-22

## 2022-09-22 VITALS
BODY MASS INDEX: 24.16 KG/M2 | OXYGEN SATURATION: 97 % | SYSTOLIC BLOOD PRESSURE: 120 MMHG | WEIGHT: 182.3 LBS | HEART RATE: 73 BPM | HEIGHT: 73 IN | DIASTOLIC BLOOD PRESSURE: 90 MMHG

## 2022-09-22 DIAGNOSIS — R73.9 HYPERGLYCEMIA: Primary | ICD-10-CM

## 2022-09-22 DIAGNOSIS — Z00.00 HEALTH CARE MAINTENANCE: ICD-10-CM

## 2022-09-22 DIAGNOSIS — K21.9 GASTROESOPHAGEAL REFLUX DISEASE, UNSPECIFIED WHETHER ESOPHAGITIS PRESENT: Chronic | ICD-10-CM

## 2022-09-22 PROCEDURE — 99396 PREV VISIT EST AGE 40-64: CPT | Performed by: FAMILY MEDICINE

## 2022-09-22 PROCEDURE — 99213 OFFICE O/P EST LOW 20 MIN: CPT | Performed by: FAMILY MEDICINE

## 2022-09-22 NOTE — PROGRESS NOTES
Subjective   Lester Benoit is a 47 y.o. male.     Chief Complaint   Patient presents with   • Annual Exam   • follow up to depression     Health maintenance    History of Present Illness   Lester Benoit 47 y.o. male who presents for an Annual Wellness Visit.  he has a history of   Patient Active Problem List   Diagnosis   • Gastroesophageal reflux disease   • Seasonal allergic rhinitis   • Obesity (BMI 30-39.9)   • Dysphagia   • Eyelid lesion   • Hyperglycemia   • Calculus of gallbladder without biliary obstruction   • Asymptomatic microscopic hematuria   • Pharyngoesophageal dysphagia   • Esophagitis, eosinophilic   • Dysthymia   • Primary insomnia   • Gross hematuria   • Renal mass, right   • Renal carcinoma, right (HCC)   • Colon cancer screening   • Health care maintenance   .  he has been feeling fairly well.   I  reviewed health maintenance with him as part of my preventative care plan.    The following portions of the patient's history were reviewed and updated as appropriate: allergies, current medications, past family history, past medical history, past social history, past surgical history and problem list.    Review of Systems   Constitutional: Negative for appetite change, fever and unexpected weight change.   HENT: Negative for ear pain, facial swelling and sore throat.    Eyes: Negative for pain and visual disturbance.   Respiratory: Negative for chest tightness, shortness of breath and wheezing.    Cardiovascular: Negative for chest pain and palpitations.   Gastrointestinal: Negative for abdominal pain and blood in stool.   Endocrine: Negative.    Genitourinary: Negative for difficulty urinating and hematuria.   Musculoskeletal: Negative for joint swelling.   Neurological: Negative for tremors, seizures and syncope.   Hematological: Negative for adenopathy.   Psychiatric/Behavioral: Negative.        Objective   Physical Exam  Vitals and nursing note reviewed.   Constitutional:       Appearance: Normal  appearance. He is well-developed. He is not diaphoretic.   HENT:      Head: Normocephalic and atraumatic.      Right Ear: Tympanic membrane, ear canal and external ear normal.      Left Ear: Tympanic membrane, ear canal and external ear normal.   Eyes:      General: Lids are normal. No scleral icterus.     Extraocular Movements: Extraocular movements intact.      Conjunctiva/sclera: Conjunctivae normal.   Neck:      Thyroid: No thyroid mass or thyromegaly.      Vascular: No carotid bruit or JVD.   Cardiovascular:      Rate and Rhythm: Normal rate and regular rhythm.      Pulses: Normal pulses.           Radial pulses are 2+ on the right side and 2+ on the left side.      Heart sounds: Normal heart sounds. No murmur heard.  Pulmonary:      Effort: Pulmonary effort is normal. No respiratory distress.      Breath sounds: Normal breath sounds.   Abdominal:      Palpations: Abdomen is soft.   Musculoskeletal:      Cervical back: Normal range of motion.      Right lower leg: No edema.      Left lower leg: No edema.   Skin:     General: Skin is warm and dry.      Coloration: Skin is not pale.      Findings: No erythema or rash.   Neurological:      General: No focal deficit present.      Mental Status: He is alert and oriented to person, place, and time.      Sensory: No sensory deficit.      Deep Tendon Reflexes: Reflexes are normal and symmetric.   Psychiatric:         Mood and Affect: Mood normal.         Behavior: Behavior normal. Behavior is cooperative.         Thought Content: Thought content normal.         Judgment: Judgment normal.         Assessment & Plan   Diagnoses and all orders for this visit:    1. Hyperglycemia (Primary)  -     Ambulatory Referral to Nutrition Services    2. Gastroesophageal reflux disease, unspecified whether esophagitis present    3. Health care maintenance  -     Hemoglobin A1c  -     Hepatitis C Antibody  -     Lipid Panel      Continue times at healthy lifestyle calorie appropriate  diet and regular physical activity  Education provided regarding her detection of cancers with screening current with colon cancer screening  Immunizations up-to-date  Follow-up results of blood work as well as ongoing management of chronic problems and preventatively annually

## 2022-09-22 NOTE — PROGRESS NOTES
"Chief Complaint  Annual Exam and follow up to depression    Subjective        Lester Benoit presents to [facility]  History of Present Illness  Patient acute problem of rash noticed mostly on his hands and feet do not itch come out last few weeks and his hands between his third and fourth digit he also has some similar looking lesions on his dorsal aspect of his feet no known exposures although he washes hands regularly at work  Objective   Vital Signs:  /90 (BP Location: Right arm, Patient Position: Sitting, Cuff Size: Large Adult)   Pulse 73   Ht 185.4 cm (73\")   Wt 82.7 kg (182 lb 4.8 oz)   SpO2 97%   BMI 24.05 kg/m²   Estimated body mass index is 24.05 kg/m² as calculated from the following:    Height as of this encounter: 185.4 cm (73\").    Weight as of this encounter: 82.7 kg (182 lb 4.8 oz).    BMI is within normal parameters. No other follow-up for BMI required.      Physical Exam  Vitals and nursing note reviewed.   Constitutional:       Appearance: Normal appearance.   Skin:     Findings: Rash present.      Comments: Erythema hands similar lesions on dorsal aspects of the feet with some erythematous exudate honey crusted between the fourth and fifth toes left foot   Neurological:      Mental Status: He is alert.   Psychiatric:         Mood and Affect: Mood normal.         Behavior: Behavior normal.         Thought Content: Thought content normal.         Judgment: Judgment normal.        Result Review :                  Assessment and Plan   Diagnoses and all orders for this visit:    1. Hyperglycemia (Primary)  -     Ambulatory Referral to Nutrition Services    2. Gastroesophageal reflux disease, unspecified whether esophagitis present    3. Health care maintenance  -     Hemoglobin A1c  -     Hepatitis C Antibody  -     Lipid Panel    Lamisil for foot rash  Results of testing for further treatment of hyperglycemia         Follow Up   Return in about 3 months " (around 12/22/2022), or if symptoms worsen or fail to improve, for Recheck.  Patient was given instructions and counseling regarding his condition or for health maintenance advice. Please see specific information pulled into the AVS if appropriate.

## 2022-10-03 ENCOUNTER — HOSPITAL ENCOUNTER (OUTPATIENT)
Dept: DIABETES SERVICES | Facility: HOSPITAL | Age: 47
Discharge: HOME | End: 2022-10-03
Admitting: FAMILY MEDICINE

## 2022-10-03 PROCEDURE — 97802 MEDICAL NUTRITION INDIV IN: CPT

## 2022-10-03 NOTE — CONSULTS
Mr. Benoit was seen today by Registered Dietitian for Diabetes diet education for hyperglycemia. Consistent with the ADA’s standards of care, a comprehensive assessment/training record has been sent to medical records (see media tab).    Last BMP 127mg/dl glucose. Last A1c 2/2018 wnl. States he needs to go get his labs drawn and plans to soon. Has gained ~30# since COVID began. 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 Encouraged 150 min/week. Does have a family history of diabetes. Consistent carb meal pattern 60g per meal, labels, meal planning discussed.     Mr. Benoit has been encouraged to call our office with questions or additional education needs. Please place referral for additional services or follow-up should need arise.    Thank you for the referral.

## 2022-10-18 RX ORDER — CITALOPRAM 10 MG/1
10 TABLET ORAL DAILY
Qty: 90 TABLET | Refills: 0 | Status: CANCELLED | OUTPATIENT
Start: 2022-10-18

## 2022-10-18 RX ORDER — CITALOPRAM 10 MG/1
10 TABLET ORAL DAILY
Qty: 90 TABLET | Refills: 0 | Status: SHIPPED | OUTPATIENT
Start: 2022-10-18 | End: 2023-01-16 | Stop reason: SDUPTHER

## 2022-11-23 RX ORDER — ESOMEPRAZOLE MAGNESIUM 40 MG/1
40 CAPSULE, DELAYED RELEASE ORAL DAILY
Qty: 90 CAPSULE | Refills: 3 | Status: SHIPPED | OUTPATIENT
Start: 2022-11-23

## 2023-01-16 RX ORDER — CITALOPRAM 10 MG/1
10 TABLET ORAL DAILY
Qty: 90 TABLET | Refills: 0 | Status: SHIPPED | OUTPATIENT
Start: 2023-01-16

## 2023-01-16 RX ORDER — CITALOPRAM 10 MG/1
10 TABLET ORAL DAILY
Qty: 90 TABLET | Refills: 0 | Status: CANCELLED | OUTPATIENT
Start: 2023-01-16

## 2023-01-24 NOTE — TELEPHONE ENCOUNTER
ANN-MARIE Mcadams for colonoscopy/egd on 3/22/23  arrive at  945am  . Mailed Prep instructions to Mailing address on-file. ----miralax

## 2023-03-22 ENCOUNTER — HOSPITAL ENCOUNTER (OUTPATIENT)
Facility: HOSPITAL | Age: 48
Setting detail: HOSPITAL OUTPATIENT SURGERY
Discharge: HOME | End: 2023-03-22
Attending: INTERNAL MEDICINE | Admitting: INTERNAL MEDICINE
Payer: COMMERCIAL

## 2023-03-22 ENCOUNTER — ANESTHESIA EVENT (OUTPATIENT)
Dept: GASTROENTEROLOGY | Facility: HOSPITAL | Age: 48
End: 2023-03-22
Payer: COMMERCIAL

## 2023-03-22 ENCOUNTER — ANESTHESIA (OUTPATIENT)
Dept: GASTROENTEROLOGY | Facility: HOSPITAL | Age: 48
End: 2023-03-22
Payer: COMMERCIAL

## 2023-03-22 VITALS
HEART RATE: 70 BPM | TEMPERATURE: 98.2 F | HEIGHT: 73 IN | OXYGEN SATURATION: 95 % | BODY MASS INDEX: 38.16 KG/M2 | RESPIRATION RATE: 18 BRPM | SYSTOLIC BLOOD PRESSURE: 122 MMHG | WEIGHT: 287.9 LBS | DIASTOLIC BLOOD PRESSURE: 84 MMHG

## 2023-03-22 DIAGNOSIS — Z12.11 COLON CANCER SCREENING: ICD-10-CM

## 2023-03-22 DIAGNOSIS — K21.9 GASTROESOPHAGEAL REFLUX DISEASE, UNSPECIFIED WHETHER ESOPHAGITIS PRESENT: ICD-10-CM

## 2023-03-22 DIAGNOSIS — K20.0 ESOPHAGITIS, EOSINOPHILIC: ICD-10-CM

## 2023-03-22 PROCEDURE — 45380 COLONOSCOPY AND BIOPSY: CPT | Performed by: INTERNAL MEDICINE

## 2023-03-22 PROCEDURE — 88313 SPECIAL STAINS GROUP 2: CPT | Performed by: INTERNAL MEDICINE

## 2023-03-22 PROCEDURE — 45381 COLONOSCOPY SUBMUCOUS NJX: CPT | Performed by: INTERNAL MEDICINE

## 2023-03-22 PROCEDURE — 43239 EGD BIOPSY SINGLE/MULTIPLE: CPT | Performed by: INTERNAL MEDICINE

## 2023-03-22 PROCEDURE — 45385 COLONOSCOPY W/LESION REMOVAL: CPT | Performed by: INTERNAL MEDICINE

## 2023-03-22 PROCEDURE — 88305 TISSUE EXAM BY PATHOLOGIST: CPT | Performed by: INTERNAL MEDICINE

## 2023-03-22 PROCEDURE — 25010000002 PROPOFOL 10 MG/ML EMULSION: Performed by: NURSE ANESTHETIST, CERTIFIED REGISTERED

## 2023-03-22 PROCEDURE — S0260 H&P FOR SURGERY: HCPCS | Performed by: INTERNAL MEDICINE

## 2023-03-22 DEVICE — DEV CLIP ENDO RESOLUTION360 CONTRL ROT 235CM: Type: IMPLANTABLE DEVICE | Site: SIGMOID COLON | Status: FUNCTIONAL

## 2023-03-22 RX ORDER — SODIUM CHLORIDE 0.9 % (FLUSH) 0.9 %
10 SYRINGE (ML) INJECTION AS NEEDED
Status: DISCONTINUED | OUTPATIENT
Start: 2023-03-22 | End: 2023-03-22 | Stop reason: HOSPADM

## 2023-03-22 RX ORDER — SODIUM CHLORIDE 0.9 % (FLUSH) 0.9 %
10 SYRINGE (ML) INJECTION EVERY 12 HOURS SCHEDULED
Status: DISCONTINUED | OUTPATIENT
Start: 2023-03-22 | End: 2023-03-22 | Stop reason: HOSPADM

## 2023-03-22 RX ORDER — PROPOFOL 10 MG/ML
VIAL (ML) INTRAVENOUS CONTINUOUS PRN
Status: DISCONTINUED | OUTPATIENT
Start: 2023-03-22 | End: 2023-03-22 | Stop reason: SURG

## 2023-03-22 RX ORDER — SODIUM CHLORIDE, SODIUM LACTATE, POTASSIUM CHLORIDE, CALCIUM CHLORIDE 600; 310; 30; 20 MG/100ML; MG/100ML; MG/100ML; MG/100ML
30 INJECTION, SOLUTION INTRAVENOUS CONTINUOUS
Status: DISCONTINUED | OUTPATIENT
Start: 2023-03-22 | End: 2023-03-22 | Stop reason: HOSPADM

## 2023-03-22 RX ORDER — LIDOCAINE HYDROCHLORIDE 20 MG/ML
INJECTION, SOLUTION INFILTRATION; PERINEURAL AS NEEDED
Status: DISCONTINUED | OUTPATIENT
Start: 2023-03-22 | End: 2023-03-22 | Stop reason: SURG

## 2023-03-22 RX ORDER — PROPOFOL 10 MG/ML
VIAL (ML) INTRAVENOUS AS NEEDED
Status: DISCONTINUED | OUTPATIENT
Start: 2023-03-22 | End: 2023-03-22 | Stop reason: SURG

## 2023-03-22 RX ADMIN — SODIUM CHLORIDE, POTASSIUM CHLORIDE, SODIUM LACTATE AND CALCIUM CHLORIDE 30 ML/HR: 600; 310; 30; 20 INJECTION, SOLUTION INTRAVENOUS at 10:05

## 2023-03-22 RX ADMIN — PROPOFOL 50 MG: 10 INJECTION, EMULSION INTRAVENOUS at 10:25

## 2023-03-22 RX ADMIN — PROPOFOL 50 MG: 10 INJECTION, EMULSION INTRAVENOUS at 10:20

## 2023-03-22 RX ADMIN — PROPOFOL 100 MG: 10 INJECTION, EMULSION INTRAVENOUS at 10:16

## 2023-03-22 RX ADMIN — PROPOFOL 50 MG: 10 INJECTION, EMULSION INTRAVENOUS at 10:31

## 2023-03-22 RX ADMIN — Medication 300 MCG/KG/MIN: at 10:16

## 2023-03-22 RX ADMIN — LIDOCAINE HYDROCHLORIDE 60 MG: 20 INJECTION, SOLUTION INFILTRATION; PERINEURAL at 10:16

## 2023-03-22 NOTE — ANESTHESIA POSTPROCEDURE EVALUATION
"Patient: Lester Benoit    Procedure Summary     Date: 03/22/23 Room / Location:  [facility]    Anesthesia Start: 1011 Anesthesia Stop: 1115    Procedures:       COLONOSCOPY INTO CECUM/ TERMINAL ILEUM WITH POLYPECTOMY X 6, SALINE LIFT X 1, CLIPS X 2      ESOPHAGOGASTRODUODENOSCOPY WITH BIOPSIES (Esophagus) Diagnosis:       Gastroesophageal reflux disease, unspecified whether esophagitis present      Esophagitis, eosinophilic      Colon cancer screening      (Gastroesophageal reflux disease, unspecified whether esophagitis present [K21.9])      (Esophagitis, eosinophilic [K20.0])      (Colon cancer screening [Z12.11])    Surgeons: Anitra Hayes MD Provider: Frandy Sevilla MD    Anesthesia Type: MAC ASA Status: 3          Anesthesia Type: MAC    Vitals  Vitals Value Taken Time   /61 03/22/23 1113   Temp     Pulse 80 03/22/23 1118   Resp 18 03/22/23 1112   SpO2 91 % 03/22/23 1118   Vitals shown include unvalidated device data.        Post Anesthesia Care and Evaluation    Patient location during evaluation: PACU  Patient participation: complete - patient participated  Level of consciousness: awake and alert  Pain management: adequate    Airway patency: patent  Anesthetic complications: No anesthetic complications  PONV Status: controlled  Cardiovascular status: acceptable and hemodynamically stable  Respiratory status: acceptable  Hydration status: acceptable    Comments: /61 (BP Location: Left arm, Patient Position: Lying)   Pulse 86   Resp 18   Ht 185.4 cm (73\")   Wt 131 kg (287 lb 14.4 oz)   SpO2 99%   BMI 37.98 kg/m²       "

## 2023-03-22 NOTE — H&P
"[facility]  Pre Procedure History & Physical    Chief Complaint: GERD, esophagitis, colon cancer screening      HPI: 49yo M here for EGD for follow-up of GERD and esophagitis, colonoscopy at the same time for screening purposes.  Feels well.    Past Medical History:   Past Medical History:   Diagnosis Date   • Anxiety    • Depression    • GERD (gastroesophageal reflux disease)    • History of kidney stones    • Right kidney mass        Family History:  Family History   Problem Relation Age of Onset   • Lung cancer Father 69   • Hypertension Mother    • Diabetes Paternal Grandmother    • Malig Hyperthermia Neg Hx        Social History:   reports that he has never smoked. He has never used smokeless tobacco. He reports current alcohol use. He reports that he does not use drugs.    Medications:   No medications prior to admission.       Allergies:  Iodinated contrast media    ROS:    Pertinent items are noted in HPI     Objective     Height 185.4 cm (73\"), weight 127 kg (280 lb).    Physical Exam   Constitutional: Pt is oriented to person, place, and time and well-developed, well-nourished, and in no distress.   HENT:   Mouth/Throat: Oropharynx is clear and moist.   Neck: Normal range of motion. Neck supple.   Cardiovascular: Normal rate, regular rhythm and normal heart sounds.    Pulmonary/Chest: Effort normal and breath sounds normal. No respiratory distress. No  wheezes.   Abdominal: Soft. Bowel sounds are normal.   Skin: Skin is warm and dry.   Psychiatric: Mood, memory, affect and judgment normal.     Assessment & Plan     Diagnosis: GERD, esophagitis, colon cancer screening      Anticipated Surgical Procedure:  EGD  Colonoscopy    The risks, benefits, and alternatives of this procedure have been discussed with the patient or the responsible party- the patient understands and agrees to proceed.    "

## 2023-03-22 NOTE — ANESTHESIA PREPROCEDURE EVALUATION
Anesthesia Evaluation     Patient summary reviewed and Nursing notes reviewed   NPO Solid Status: > 8 hours  NPO Liquid Status: > 2 hours           Airway   Mallampati: I  TM distance: >3 FB  Neck ROM: full  No difficulty expected and Large neck circumference  Dental - normal exam     Pulmonary - negative pulmonary ROS and normal exam   Cardiovascular - negative cardio ROS and normal exam  Exercise tolerance: good (4-7 METS)        Neuro/Psych  (+) psychiatric history,    GI/Hepatic/Renal/Endo    (+) obesity, morbid obesity, GERD well controlled,  renal disease,     Musculoskeletal (-) negative ROS    Abdominal   (+) obese,     Bowel sounds: normal.   Substance History - negative use     OB/GYN negative ob/gyn ROS         Other      history of cancer remission    ROS/Med Hx Other: H/O hematuria.     Rt Kidney mass.                    Anesthesia Plan    ASA 3     MAC   total IV anesthesia  (I have reviewed the patient's history with the patient and the chart, including all pertinent laboratory results and imaging. I have explained the risks of anesthesia including but not limited to dental damage, corneal abrasion, nerve injury, MI, stroke, and death. Questions asked and answered. Anesthetic plan discussed with patient and team as indicated. Patient expressed understanding of the above.  )    Anesthetic plan, risks, benefits, and alternatives have been provided, discussed and informed consent has been obtained with: patient.

## 2023-03-26 LAB
LAB AP CASE REPORT: NORMAL
LAB AP SPECIAL STAINS: NORMAL
PATH REPORT.FINAL DX SPEC: NORMAL
PATH REPORT.GROSS SPEC: NORMAL

## 2023-03-27 NOTE — PROGRESS NOTES
EGD results: Mild gastritis.  Small segment of developing Padilla's esophagus, no evidence of dysplasia.  No further evidence of eosinophilia in the esophagus    His colon polyps included a tubular adenoma and benign hyperplastic polyps    3-year EGD recall    7-year colonoscopy recall    Continue daily Nexium to prevent progression of Padilla's esophagus    6-month office follow-up

## 2023-03-29 ENCOUNTER — TELEPHONE (OUTPATIENT)
Dept: GASTROENTEROLOGY | Facility: CLINIC | Age: 48
End: 2023-03-29
Payer: COMMERCIAL

## 2023-03-29 NOTE — TELEPHONE ENCOUNTER
It is noted that this pt has seen their message from Dr Hayes  My chart message to pt to call and schedule fu  EGD recall entered 3/22/26  Cs recall 3/22/30

## 2023-03-29 NOTE — TELEPHONE ENCOUNTER
----- Message from Anitra Hayes MD sent at 3/27/2023  5:06 PM EDT -----  EGD results: Mild gastritis.  Small segment of developing Padilla's esophagus, no evidence of dysplasia.  No further evidence of eosinophilia in the esophagus    His colon polyps included a tubular adenoma and benign hyperplastic polyps    3-year EGD recall    7-year colonoscopy recall    Continue daily Nexium to prevent progression of Padilla's esophagus    6-month office follow-up

## 2023-04-17 RX ORDER — CITALOPRAM 10 MG/1
10 TABLET ORAL DAILY
Qty: 90 TABLET | Refills: 0 | Status: SHIPPED | OUTPATIENT
Start: 2023-04-17

## 2023-08-09 RX ORDER — CITALOPRAM HYDROBROMIDE 10 MG/1
10 TABLET ORAL DAILY
Qty: 30 TABLET | Refills: 0 | Status: SHIPPED | OUTPATIENT
Start: 2023-08-09

## 2023-09-08 RX ORDER — CITALOPRAM HYDROBROMIDE 10 MG/1
10 TABLET ORAL DAILY
Qty: 30 TABLET | Refills: 0 | Status: SHIPPED | OUTPATIENT
Start: 2023-09-08

## 2023-09-08 NOTE — TELEPHONE ENCOUNTER
Rx Refill Note  Requested Prescriptions     Pending Prescriptions Disp Refills    citalopram (CeleXA) 10 MG tablet 30 tablet 0     Sig: Take 1 tablet by mouth Daily.      Last office visit with prescribing clinician: 9/22/2022   Last telemedicine visit with prescribing clinician: Visit date not found   Next office visit with prescribing clinician: Visit date not found                         Would you like a call back once the refill request has been completed: [] Yes [] No    If the office needs to give you a call back, can they leave a voicemail: [] Yes [] No    Maday Boykin MA  09/08/23, 09:43 EDT

## 2023-10-04 RX ORDER — CITALOPRAM HYDROBROMIDE 10 MG/1
10 TABLET ORAL DAILY
Qty: 30 TABLET | Refills: 0 | OUTPATIENT
Start: 2023-10-04

## 2023-12-17 RX ORDER — ESOMEPRAZOLE MAGNESIUM 40 MG/1
40 CAPSULE, DELAYED RELEASE ORAL DAILY
Qty: 90 CAPSULE | Refills: 3 | Status: CANCELLED | OUTPATIENT
Start: 2023-11-24

## 2023-12-19 ENCOUNTER — TRANSCRIBE ORDERS (OUTPATIENT)
Dept: ADMINISTRATIVE | Facility: HOSPITAL | Age: 48
End: 2023-12-19
Payer: COMMERCIAL

## 2023-12-19 DIAGNOSIS — N28.89 KIDNEY MASS: Primary | ICD-10-CM

## 2024-01-02 RX ORDER — ESOMEPRAZOLE MAGNESIUM 40 MG/1
40 CAPSULE, DELAYED RELEASE ORAL DAILY
Qty: 90 CAPSULE | Refills: 0 | Status: SHIPPED | OUTPATIENT
Start: 2024-01-02

## 2024-01-02 NOTE — TELEPHONE ENCOUNTER
Patient is due for follow-up no later than March.  This is his yearly.  Please have him make appointment.  Refilled for 90 days.

## 2024-01-02 NOTE — TELEPHONE ENCOUNTER
Caller: Lester Benoit    Relationship: Self    Best call back number: [phone]     Requested Prescriptions:   Requested Prescriptions     Pending Prescriptions Disp Refills    esomeprazole (nexIUM) 40 MG capsule 90 capsule 3     Sig: Take 1 capsule by mouth Daily.        Pharmacy where request should be sent:   PHARMACY ON FILE    Last office visit with prescribing clinician: Visit date not found   Last telemedicine visit with prescribing clinician: Visit date not found   Next office visit with prescribing clinician: Visit date not found     Additional details provided by patient: PT IS NEEDING A REFILL ON HIS NEXIUM. PT IS GETTING LOW ON MEDICATION.    Does the patient have less than a 3 day supply:  [] Yes  [x] No    Would you like a call back once the refill request has been completed: [] Yes [x] No    If the office needs to give you a call back, can they leave a voicemail: [] Yes [x] No    Bernadette Aguilar Rep   01/02/24 11:37 EST

## 2024-01-14 ENCOUNTER — HOSPITAL ENCOUNTER (OUTPATIENT)
Facility: HOSPITAL | Age: 49
Discharge: HOME | End: 2024-01-14
Admitting: UROLOGY
Payer: COMMERCIAL

## 2024-01-14 DIAGNOSIS — N28.89 KIDNEY MASS: ICD-10-CM

## 2024-01-14 PROCEDURE — 74183 MRI ABD W/O CNTR FLWD CNTR: CPT

## 2024-01-14 PROCEDURE — A9577 INJ MULTIHANCE: HCPCS | Performed by: UROLOGY

## 2024-01-14 PROCEDURE — 0 GADOBENATE DIMEGLUMINE 529 MG/ML SOLUTION: Performed by: UROLOGY

## 2024-01-14 RX ADMIN — GADOBENATE DIMEGLUMINE 20 ML: 529 INJECTION, SOLUTION INTRAVENOUS at 09:21

## 2024-02-26 ENCOUNTER — PRE-ADMISSION TESTING (OUTPATIENT)
Dept: PREADMISSION TESTING | Facility: HOSPITAL | Age: 49
End: 2024-02-26
Payer: COMMERCIAL

## 2024-02-26 ENCOUNTER — LAB (OUTPATIENT)
Dept: LAB | Facility: HOSPITAL | Age: 49
End: 2024-02-26
Payer: COMMERCIAL

## 2024-02-26 VITALS
HEART RATE: 82 BPM | OXYGEN SATURATION: 95 % | RESPIRATION RATE: 16 BRPM | HEIGHT: 74 IN | SYSTOLIC BLOOD PRESSURE: 142 MMHG | TEMPERATURE: 97.5 F | WEIGHT: 289.6 LBS | DIASTOLIC BLOOD PRESSURE: 95 MMHG | BODY MASS INDEX: 37.17 KG/M2

## 2024-02-26 LAB
ANION GAP SERPL CALCULATED.3IONS-SCNC: 9.9 MMOL/L (ref 5–15)
BUN SERPL-MCNC: 11 MG/DL (ref 6–20)
BUN/CREAT SERPL: 13.3 (ref 7–25)
CALCIUM SPEC-SCNC: 9.3 MG/DL (ref 8.6–10.5)
CHLORIDE SERPL-SCNC: 105 MMOL/L (ref 98–107)
CO2 SERPL-SCNC: 23.1 MMOL/L (ref 22–29)
CREAT SERPL-MCNC: 0.83 MG/DL (ref 0.76–1.27)
DEPRECATED RDW RBC AUTO: 37.7 FL (ref 37–54)
EGFRCR SERPLBLD CKD-EPI 2021: 108.6 ML/MIN/1.73
ERYTHROCYTE [DISTWIDTH] IN BLOOD BY AUTOMATED COUNT: 13.2 % (ref 12.3–15.4)
GLUCOSE SERPL-MCNC: 82 MG/DL (ref 65–99)
HCT VFR BLD AUTO: 43.7 % (ref 37.5–51)
HGB BLD-MCNC: 14.5 G/DL (ref 13–17.7)
MCH RBC QN AUTO: 26.6 PG (ref 26.6–33)
MCHC RBC AUTO-ENTMCNC: 33.2 G/DL (ref 31.5–35.7)
MCV RBC AUTO: 80.2 FL (ref 79–97)
PLATELET # BLD AUTO: 197 10*3/MM3 (ref 140–450)
PMV BLD AUTO: 10.2 FL (ref 6–12)
POTASSIUM SERPL-SCNC: 3.7 MMOL/L (ref 3.5–5.2)
RBC # BLD AUTO: 5.45 10*6/MM3 (ref 4.14–5.8)
SODIUM SERPL-SCNC: 138 MMOL/L (ref 136–145)
WBC NRBC COR # BLD AUTO: 6.79 10*3/MM3 (ref 3.4–10.8)

## 2024-02-26 PROCEDURE — 85027 COMPLETE CBC AUTOMATED: CPT

## 2024-02-26 PROCEDURE — 80048 BASIC METABOLIC PNL TOTAL CA: CPT

## 2024-02-26 PROCEDURE — 36415 COLL VENOUS BLD VENIPUNCTURE: CPT

## 2024-02-26 NOTE — DISCHARGE INSTRUCTIONS
Take the following medications the morning of surgery with a small sip of water:    CELEXA AND NEXIUM    If you are on prescription narcotic pain medication to control your pain you may also take that medication the morning of surgery.    General Instructions:  Do not eat or drink anything 8 HOURS PRIOR TO SURGERY  Patients who avoid smoking, chewing tobacco and alcohol for 4 weeks prior to surgery have a reduced risk of post-operative complications.  Quit smoking as many days before surgery as you can.  Do not smoke, use chewing tobacco or drink alcohol the day of surgery.   Bring any papers given to you in the doctor’s office.  Wear clean comfortable clothes.  Do not wear contact lenses, false eyelashes or make-up.  Bring a case for your glasses.   Remove all piercings.  Leave jewelry and any other valuables at home.  The Pre-Admission Testing nurse will instruct you to bring medications if unable to obtain an accurate list in Pre-Admission Testing.    REPORT TO SURGERY ENTRANCE ON 3-4-24  AM        Preventing a Surgical Site Infection:  For 2 to 3 days before surgery, avoid shaving with a razor because the razor can irritate skin and make it easier to develop an infection.    Any areas of open skin can increase the risk of a post-operative wound infection by allowing bacteria to enter and travel throughout the body.  Notify your surgeon if you have any skin wounds / rashes even if it is not near the expected surgical site.  The area will need assessed to determine if surgery should be delayed until it is healed.  The night prior to surgery shower using a fresh bar of anti-bacterial soap (such as Dial) and clean washcloth.  Sleep in a clean bed with clean clothing.  Do not allow pets to sleep with you.  Shower on the morning of surgery using a fresh bar of anti-bacterial soap (such as Dial) and clean washcloth.  Dry with a clean towel and dress in clean clothing.  Ask your surgeon if you will be receiving  antibiotics prior to surgery.  Make sure you, your family, and all healthcare providers clean their hands with soap and water or an alcohol based hand  before caring for you or your wound.    Day of surgery:  Your arrival time is approximately two hours before your scheduled surgery time.  Upon arrival, a Pre-op nurse and Anesthesiologist will review your health history, obtain vital signs, and answer questions you may have.  The only belongings needed at this time will be your home medications and if applicable your C-PAP/BI-PAP machine.  A Pre-op nurse will start an IV and you may receive medication in preparation for surgery, including something to help you relax.      Please be aware that surgery does come with discomfort.  We want to make every effort to control your discomfort so please discuss any uncontrolled symptoms with your nurse.   Your doctor will most likely have prescribed pain medications.          If you are staying overnight following surgery, you will be transported to your hospital room following the recovery period.  [facility] has all private rooms.    If you have any questions please call Pre-Admission Testing at [phone].  Deductibles and co-payments are collected on the day of service. Please be prepared to pay the required co-pay, deductible or deposit on the day of service as defined by your plan.    Call your surgeon immediately if you experience any of the following symptoms:  Sore Throat  Shortness of Breath or difficulty breathing  Cough  Chills  Body soreness or muscle pain  Headache  Fever  New loss of taste or smell  Do not arrive for your surgery ill.  Your procedure will need to be rescheduled to another time.  You will need to call your physician before the day of surgery to avoid any unnecessary exposure to hospital staff as well as other patients.

## 2024-03-26 ENCOUNTER — OFFICE VISIT (OUTPATIENT)
Dept: GASTROENTEROLOGY | Facility: CLINIC | Age: 49
End: 2024-03-26
Payer: COMMERCIAL

## 2024-03-26 VITALS
DIASTOLIC BLOOD PRESSURE: 88 MMHG | TEMPERATURE: 96.8 F | HEIGHT: 74 IN | BODY MASS INDEX: 36.72 KG/M2 | OXYGEN SATURATION: 95 % | WEIGHT: 286.1 LBS | SYSTOLIC BLOOD PRESSURE: 138 MMHG | HEART RATE: 69 BPM

## 2024-03-26 DIAGNOSIS — K22.70 BARRETT'S ESOPHAGUS WITHOUT DYSPLASIA: ICD-10-CM

## 2024-03-26 DIAGNOSIS — K21.9 GASTROESOPHAGEAL REFLUX DISEASE, UNSPECIFIED WHETHER ESOPHAGITIS PRESENT: Primary | ICD-10-CM

## 2024-03-26 DIAGNOSIS — K20.0 ESOPHAGITIS, EOSINOPHILIC: ICD-10-CM

## 2024-03-26 DIAGNOSIS — Z86.010 PERSONAL HISTORY OF COLONIC POLYPS: ICD-10-CM

## 2024-03-26 PROCEDURE — 99213 OFFICE O/P EST LOW 20 MIN: CPT | Performed by: PHYSICIAN ASSISTANT

## 2024-03-26 RX ORDER — ESOMEPRAZOLE MAGNESIUM 40 MG/1
40 CAPSULE, DELAYED RELEASE ORAL DAILY
Qty: 90 CAPSULE | Refills: 0 | Status: CANCELLED | OUTPATIENT
Start: 2024-03-26

## 2024-03-26 RX ORDER — ESOMEPRAZOLE MAGNESIUM 40 MG/1
40 CAPSULE, DELAYED RELEASE ORAL DAILY
Qty: 90 CAPSULE | Refills: 3 | Status: SHIPPED | OUTPATIENT
Start: 2024-03-26

## 2024-03-26 NOTE — PROGRESS NOTES
"Chief Complaint  Gastroesophageal reflux disease    Subjective          History of Present Illness    Lester Benoit is a  49 y.o. male presents for follow-up on GERD with history of EOE and last EGD showing small segment of Padilla's esophagus.  He is a former patient Dr. Hayes.  He is new to me.    He takes esomeprazole 40 mg daily which works well for him. He has diarrhea intermittently with this.  Denies frequent flares of GERD, dyspepsia.  Denies nausea, vomiting, Mary Jane hematochezia.    2/26/2024 BMP and CBC unremarkable    3/22/23 EGD with mild gastritis, small segment of developing Padilla's esophagus, negative dysplasia.  No further evidence of EOE.  Recall 3 years.  Colonoscopy at the same time showed tubular adenoma and benign hyperplastic polyp.  Recall 7 years.    He was just diagnosed with recurrence renal cell carcinoma, scheduled for right partial nephrectomy tomorrow    Objective   Vital Signs:   /88   Pulse 69   Temp 96.8 °F (36 °C)   Ht 186.7 cm (73.5\")   Wt 130 kg (286 lb 1.6 oz)   SpO2 95%   BMI 37.23 kg/m²       Physical Exam  Vitals reviewed.   Constitutional:       General: He is awake. He is not in acute distress.     Appearance: Normal appearance. He is well-developed and well-groomed.   HENT:      Head: Normocephalic.   Pulmonary:      Effort: Pulmonary effort is normal. No respiratory distress.   Skin:     Coloration: Skin is not pale.   Neurological:      Mental Status: He is alert and oriented to person, place, and time.      Gait: Gait is intact.   Psychiatric:         Mood and Affect: Mood and affect normal.         Speech: Speech normal.         Behavior: Behavior is cooperative.         Judgment: Judgment normal.          Result Review :             Assessment and Plan    Diagnoses and all orders for this visit:    1. Gastroesophageal reflux disease, unspecified whether esophagitis present (Primary)  -     PHARMACOGENOMICS PROFILE, ACTX - Swab,; Future    2. 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 Esophagitis, eosinophilic    4. Personal history of colonic polyps    Other orders  -     esomeprazole (nexIUM) 40 MG capsule; Take 1 capsule by mouth Daily.  Dispense: 90 capsule; Refill: 3    Start fiber for diarrhea. Likely related to PPI.  Could consider reducing dose of this.  Due repeat EGD in 2026.  Recall colonoscopy in 2030.      Follow Up   Return in about 1 year (around 3/26/2025).    Dragon dictation used throughout this note.     Jackeline Burger PA-C

## 2024-03-27 ENCOUNTER — ANESTHESIA EVENT (OUTPATIENT)
Dept: PERIOP | Facility: HOSPITAL | Age: 49
End: 2024-03-27
Payer: COMMERCIAL

## 2024-03-27 ENCOUNTER — HOSPITAL ENCOUNTER (OUTPATIENT)
Facility: HOSPITAL | Age: 49
Discharge: HOME | End: 2024-03-28
Attending: UROLOGY | Admitting: UROLOGY
Payer: COMMERCIAL

## 2024-03-27 ENCOUNTER — ANESTHESIA (OUTPATIENT)
Dept: PERIOP | Facility: HOSPITAL | Age: 49
End: 2024-03-27
Payer: COMMERCIAL

## 2024-03-27 DIAGNOSIS — N28.89 RIGHT RENAL MASS: ICD-10-CM

## 2024-03-27 DIAGNOSIS — N28.89 RIGHT KIDNEY MASS: Primary | ICD-10-CM

## 2024-03-27 PROCEDURE — 25010000002 HYDROMORPHONE PER 4 MG: Performed by: NURSE ANESTHETIST, CERTIFIED REGISTERED

## 2024-03-27 PROCEDURE — 88342 IMHCHEM/IMCYTCHM 1ST ANTB: CPT | Performed by: UROLOGY

## 2024-03-27 PROCEDURE — G0378 HOSPITAL OBSERVATION PER HR: HCPCS

## 2024-03-27 PROCEDURE — 25010000002 HYDROMORPHONE 1 MG/ML SOLUTION: Performed by: UROLOGY

## 2024-03-27 PROCEDURE — 25010000002 DEXAMETHASONE SODIUM PHOSPHATE 20 MG/5ML SOLUTION: Performed by: NURSE ANESTHETIST, CERTIFIED REGISTERED

## 2024-03-27 PROCEDURE — 88307 TISSUE EXAM BY PATHOLOGIST: CPT | Performed by: UROLOGY

## 2024-03-27 PROCEDURE — 25810000003 LACTATED RINGERS PER 1000 ML: Performed by: ANESTHESIOLOGY

## 2024-03-27 PROCEDURE — 25010000002 KETOROLAC TROMETHAMINE PER 15 MG: Performed by: NURSE ANESTHETIST, CERTIFIED REGISTERED

## 2024-03-27 PROCEDURE — 25010000002 GLYCOPYRROLATE 0.2 MG/ML SOLUTION: Performed by: NURSE ANESTHETIST, CERTIFIED REGISTERED

## 2024-03-27 PROCEDURE — 88341 IMHCHEM/IMCYTCHM EA ADD ANTB: CPT | Performed by: UROLOGY

## 2024-03-27 PROCEDURE — 25010000002 ONDANSETRON PER 1 MG: Performed by: NURSE ANESTHETIST, CERTIFIED REGISTERED

## 2024-03-27 PROCEDURE — 25010000002 SUGAMMADEX 200 MG/2ML SOLUTION: Performed by: NURSE ANESTHETIST, CERTIFIED REGISTERED

## 2024-03-27 PROCEDURE — 25010000002 FENTANYL CITRATE (PF) 50 MCG/ML SOLUTION: Performed by: ANESTHESIOLOGY

## 2024-03-27 PROCEDURE — 25010000002 CEFAZOLIN 3 G RECONSTITUTED SOLUTION 1 EACH VIAL: Performed by: UROLOGY

## 2024-03-27 PROCEDURE — 25010000002 BUPIVACAINE (PF) 0.25 % SOLUTION: Performed by: UROLOGY

## 2024-03-27 PROCEDURE — 25010000002 PROPOFOL 10 MG/ML EMULSION: Performed by: NURSE ANESTHETIST, CERTIFIED REGISTERED

## 2024-03-27 PROCEDURE — 25010000002 FENTANYL CITRATE (PF) 50 MCG/ML SOLUTION: Performed by: NURSE ANESTHETIST, CERTIFIED REGISTERED

## 2024-03-27 PROCEDURE — 25010000002 MIDAZOLAM PER 1 MG: Performed by: ANESTHESIOLOGY

## 2024-03-27 DEVICE — FLOSEAL WITH RECOTHROM - 10ML.
Type: IMPLANTABLE DEVICE | Site: ABDOMEN | Status: FUNCTIONAL
Brand: FLOSEAL HEMOSTATIC MATRIX

## 2024-03-27 RX ORDER — MAGNESIUM HYDROXIDE 1200 MG/15ML
LIQUID ORAL AS NEEDED
Status: DISCONTINUED | OUTPATIENT
Start: 2024-03-27 | End: 2024-03-27 | Stop reason: HOSPADM

## 2024-03-27 RX ORDER — HYDROMORPHONE HYDROCHLORIDE 1 MG/ML
0.5 INJECTION, SOLUTION INTRAMUSCULAR; INTRAVENOUS; SUBCUTANEOUS
Status: DISCONTINUED | OUTPATIENT
Start: 2024-03-27 | End: 2024-03-27 | Stop reason: HOSPADM

## 2024-03-27 RX ORDER — BUPIVACAINE HYDROCHLORIDE 2.5 MG/ML
INJECTION, SOLUTION EPIDURAL; INFILTRATION; INTRACAUDAL AS NEEDED
Status: DISCONTINUED | OUTPATIENT
Start: 2024-03-27 | End: 2024-03-27 | Stop reason: HOSPADM

## 2024-03-27 RX ORDER — SODIUM CHLORIDE 0.9 % (FLUSH) 0.9 %
3-10 SYRINGE (ML) INJECTION AS NEEDED
Status: DISCONTINUED | OUTPATIENT
Start: 2024-03-27 | End: 2024-03-27 | Stop reason: HOSPADM

## 2024-03-27 RX ORDER — DEXAMETHASONE SODIUM PHOSPHATE 4 MG/ML
INJECTION, SOLUTION INTRA-ARTICULAR; INTRALESIONAL; INTRAMUSCULAR; INTRAVENOUS; SOFT TISSUE AS NEEDED
Status: DISCONTINUED | OUTPATIENT
Start: 2024-03-27 | End: 2024-03-27 | Stop reason: SURG

## 2024-03-27 RX ORDER — PROMETHAZINE HYDROCHLORIDE 25 MG/1
25 TABLET ORAL ONCE AS NEEDED
Status: DISCONTINUED | OUTPATIENT
Start: 2024-03-27 | End: 2024-03-27 | Stop reason: HOSPADM

## 2024-03-27 RX ORDER — FENTANYL CITRATE 50 UG/ML
50 INJECTION, SOLUTION INTRAMUSCULAR; INTRAVENOUS
Status: DISCONTINUED | OUTPATIENT
Start: 2024-03-27 | End: 2024-03-27 | Stop reason: HOSPADM

## 2024-03-27 RX ORDER — SODIUM CHLORIDE 9 MG/ML
40 INJECTION, SOLUTION INTRAVENOUS AS NEEDED
Status: DISCONTINUED | OUTPATIENT
Start: 2024-03-27 | End: 2024-03-28 | Stop reason: HOSPADM

## 2024-03-27 RX ORDER — SODIUM CHLORIDE 0.9 % (FLUSH) 0.9 %
10 SYRINGE (ML) INJECTION AS NEEDED
Status: DISCONTINUED | OUTPATIENT
Start: 2024-03-27 | End: 2024-03-28 | Stop reason: HOSPADM

## 2024-03-27 RX ORDER — NALOXONE HCL 0.4 MG/ML
0.2 VIAL (ML) INJECTION AS NEEDED
Status: DISCONTINUED | OUTPATIENT
Start: 2024-03-27 | End: 2024-03-27 | Stop reason: HOSPADM

## 2024-03-27 RX ORDER — GLYCOPYRROLATE 0.2 MG/ML
INJECTION INTRAMUSCULAR; INTRAVENOUS AS NEEDED
Status: DISCONTINUED | OUTPATIENT
Start: 2024-03-27 | End: 2024-03-27 | Stop reason: SURG

## 2024-03-27 RX ORDER — EPHEDRINE SULFATE 50 MG/ML
5 INJECTION, SOLUTION INTRAVENOUS ONCE AS NEEDED
Status: DISCONTINUED | OUTPATIENT
Start: 2024-03-27 | End: 2024-03-27 | Stop reason: HOSPADM

## 2024-03-27 RX ORDER — DIPHENHYDRAMINE HYDROCHLORIDE 50 MG/ML
12.5 INJECTION INTRAMUSCULAR; INTRAVENOUS
Status: DISCONTINUED | OUTPATIENT
Start: 2024-03-27 | End: 2024-03-27 | Stop reason: HOSPADM

## 2024-03-27 RX ORDER — ONDANSETRON 2 MG/ML
4 INJECTION INTRAMUSCULAR; INTRAVENOUS ONCE AS NEEDED
Status: DISCONTINUED | OUTPATIENT
Start: 2024-03-27 | End: 2024-03-27 | Stop reason: HOSPADM

## 2024-03-27 RX ORDER — ONDANSETRON 2 MG/ML
INJECTION INTRAMUSCULAR; INTRAVENOUS AS NEEDED
Status: DISCONTINUED | OUTPATIENT
Start: 2024-03-27 | End: 2024-03-27 | Stop reason: SURG

## 2024-03-27 RX ORDER — LIDOCAINE HYDROCHLORIDE 20 MG/ML
INJECTION, SOLUTION INFILTRATION; PERINEURAL AS NEEDED
Status: DISCONTINUED | OUTPATIENT
Start: 2024-03-27 | End: 2024-03-27 | Stop reason: SURG

## 2024-03-27 RX ORDER — MIDAZOLAM HYDROCHLORIDE 1 MG/ML
1 INJECTION INTRAMUSCULAR; INTRAVENOUS
Status: DISCONTINUED | OUTPATIENT
Start: 2024-03-27 | End: 2024-03-27 | Stop reason: HOSPADM

## 2024-03-27 RX ORDER — PANTOPRAZOLE SODIUM 40 MG/1
40 TABLET, DELAYED RELEASE ORAL
Status: DISCONTINUED | OUTPATIENT
Start: 2024-03-28 | End: 2024-03-28 | Stop reason: HOSPADM

## 2024-03-27 RX ORDER — HYDROCODONE BITARTRATE AND ACETAMINOPHEN 5; 325 MG/1; MG/1
1 TABLET ORAL ONCE AS NEEDED
Status: DISCONTINUED | OUTPATIENT
Start: 2024-03-27 | End: 2024-03-27 | Stop reason: HOSPADM

## 2024-03-27 RX ORDER — SODIUM CHLORIDE, SODIUM LACTATE, POTASSIUM CHLORIDE, CALCIUM CHLORIDE 600; 310; 30; 20 MG/100ML; MG/100ML; MG/100ML; MG/100ML
9 INJECTION, SOLUTION INTRAVENOUS CONTINUOUS
Status: DISCONTINUED | OUTPATIENT
Start: 2024-03-27 | End: 2024-03-27

## 2024-03-27 RX ORDER — IPRATROPIUM BROMIDE AND ALBUTEROL SULFATE 2.5; .5 MG/3ML; MG/3ML
3 SOLUTION RESPIRATORY (INHALATION) ONCE AS NEEDED
Status: DISCONTINUED | OUTPATIENT
Start: 2024-03-27 | End: 2024-03-27 | Stop reason: HOSPADM

## 2024-03-27 RX ORDER — SODIUM CHLORIDE 9 MG/ML
100 INJECTION, SOLUTION INTRAVENOUS CONTINUOUS
Status: DISCONTINUED | OUTPATIENT
Start: 2024-03-27 | End: 2024-03-28 | Stop reason: HOSPADM

## 2024-03-27 RX ORDER — SODIUM CHLORIDE 0.9 % (FLUSH) 0.9 %
3 SYRINGE (ML) INJECTION EVERY 12 HOURS SCHEDULED
Status: DISCONTINUED | OUTPATIENT
Start: 2024-03-27 | End: 2024-03-28 | Stop reason: HOSPADM

## 2024-03-27 RX ORDER — DROPERIDOL 2.5 MG/ML
0.62 INJECTION, SOLUTION INTRAMUSCULAR; INTRAVENOUS
Status: DISCONTINUED | OUTPATIENT
Start: 2024-03-27 | End: 2024-03-27 | Stop reason: HOSPADM

## 2024-03-27 RX ORDER — ONDANSETRON 2 MG/ML
4 INJECTION INTRAMUSCULAR; INTRAVENOUS EVERY 6 HOURS PRN
Status: DISCONTINUED | OUTPATIENT
Start: 2024-03-27 | End: 2024-03-28 | Stop reason: HOSPADM

## 2024-03-27 RX ORDER — LIDOCAINE HYDROCHLORIDE 10 MG/ML
0.5 INJECTION, SOLUTION INFILTRATION; PERINEURAL ONCE AS NEEDED
Status: DISCONTINUED | OUTPATIENT
Start: 2024-03-27 | End: 2024-03-27 | Stop reason: HOSPADM

## 2024-03-27 RX ORDER — HYDRALAZINE HYDROCHLORIDE 20 MG/ML
5 INJECTION INTRAMUSCULAR; INTRAVENOUS
Status: DISCONTINUED | OUTPATIENT
Start: 2024-03-27 | End: 2024-03-27 | Stop reason: HOSPADM

## 2024-03-27 RX ORDER — SODIUM CHLORIDE 0.9 % (FLUSH) 0.9 %
3 SYRINGE (ML) INJECTION EVERY 12 HOURS SCHEDULED
Status: DISCONTINUED | OUTPATIENT
Start: 2024-03-27 | End: 2024-03-27 | Stop reason: HOSPADM

## 2024-03-27 RX ORDER — NALOXONE HCL 0.4 MG/ML
0.1 VIAL (ML) INJECTION
Status: DISCONTINUED | OUTPATIENT
Start: 2024-03-27 | End: 2024-03-28 | Stop reason: HOSPADM

## 2024-03-27 RX ORDER — FENTANYL CITRATE 50 UG/ML
50 INJECTION, SOLUTION INTRAMUSCULAR; INTRAVENOUS ONCE AS NEEDED
Status: COMPLETED | OUTPATIENT
Start: 2024-03-27 | End: 2024-03-27

## 2024-03-27 RX ORDER — OXYCODONE AND ACETAMINOPHEN 7.5; 325 MG/1; MG/1
1 TABLET ORAL EVERY 4 HOURS PRN
Status: DISCONTINUED | OUTPATIENT
Start: 2024-03-27 | End: 2024-03-28 | Stop reason: HOSPADM

## 2024-03-27 RX ORDER — ROCURONIUM BROMIDE 10 MG/ML
INJECTION, SOLUTION INTRAVENOUS AS NEEDED
Status: DISCONTINUED | OUTPATIENT
Start: 2024-03-27 | End: 2024-03-27 | Stop reason: SURG

## 2024-03-27 RX ORDER — CITALOPRAM HYDROBROMIDE 10 MG/1
10 TABLET ORAL DAILY
Status: DISCONTINUED | OUTPATIENT
Start: 2024-03-28 | End: 2024-03-28 | Stop reason: HOSPADM

## 2024-03-27 RX ORDER — ONDANSETRON 4 MG/1
4 TABLET, ORALLY DISINTEGRATING ORAL EVERY 6 HOURS PRN
Status: DISCONTINUED | OUTPATIENT
Start: 2024-03-27 | End: 2024-03-28 | Stop reason: HOSPADM

## 2024-03-27 RX ORDER — OXYCODONE AND ACETAMINOPHEN 7.5; 325 MG/1; MG/1
1 TABLET ORAL EVERY 4 HOURS PRN
Status: DISCONTINUED | OUTPATIENT
Start: 2024-03-27 | End: 2024-03-27 | Stop reason: HOSPADM

## 2024-03-27 RX ORDER — PROMETHAZINE HYDROCHLORIDE 25 MG/1
25 SUPPOSITORY RECTAL ONCE AS NEEDED
Status: DISCONTINUED | OUTPATIENT
Start: 2024-03-27 | End: 2024-03-27 | Stop reason: HOSPADM

## 2024-03-27 RX ORDER — FAMOTIDINE 10 MG/ML
20 INJECTION, SOLUTION INTRAVENOUS ONCE
Status: COMPLETED | OUTPATIENT
Start: 2024-03-27 | End: 2024-03-27

## 2024-03-27 RX ORDER — FLUMAZENIL 0.1 MG/ML
0.2 INJECTION INTRAVENOUS AS NEEDED
Status: DISCONTINUED | OUTPATIENT
Start: 2024-03-27 | End: 2024-03-27 | Stop reason: HOSPADM

## 2024-03-27 RX ORDER — PROPOFOL 10 MG/ML
VIAL (ML) INTRAVENOUS AS NEEDED
Status: DISCONTINUED | OUTPATIENT
Start: 2024-03-27 | End: 2024-03-27 | Stop reason: SURG

## 2024-03-27 RX ORDER — KETOROLAC TROMETHAMINE 30 MG/ML
INJECTION, SOLUTION INTRAMUSCULAR; INTRAVENOUS AS NEEDED
Status: DISCONTINUED | OUTPATIENT
Start: 2024-03-27 | End: 2024-03-27 | Stop reason: SURG

## 2024-03-27 RX ORDER — LABETALOL HYDROCHLORIDE 5 MG/ML
5 INJECTION, SOLUTION INTRAVENOUS
Status: DISCONTINUED | OUTPATIENT
Start: 2024-03-27 | End: 2024-03-27 | Stop reason: HOSPADM

## 2024-03-27 RX ADMIN — FAMOTIDINE 20 MG: 10 INJECTION INTRAVENOUS at 14:13

## 2024-03-27 RX ADMIN — ROCURONIUM BROMIDE 50 MG: 10 INJECTION, SOLUTION INTRAVENOUS at 14:08

## 2024-03-27 RX ADMIN — SODIUM CHLORIDE 3000 MG: 900 INJECTION INTRAVENOUS at 21:44

## 2024-03-27 RX ADMIN — GLYCOPYRROLATE 0.3 MG: 0.2 INJECTION INTRAMUSCULAR; INTRAVENOUS at 15:27

## 2024-03-27 RX ADMIN — HYDROMORPHONE HYDROCHLORIDE 0.5 MG: 1 INJECTION, SOLUTION INTRAMUSCULAR; INTRAVENOUS; SUBCUTANEOUS at 16:48

## 2024-03-27 RX ADMIN — SUGAMMADEX 200 MG: 100 INJECTION, SOLUTION INTRAVENOUS at 15:57

## 2024-03-27 RX ADMIN — PROPOFOL 120 MG: 10 INJECTION, EMULSION INTRAVENOUS at 15:01

## 2024-03-27 RX ADMIN — FENTANYL CITRATE 50 MCG: 50 INJECTION, SOLUTION INTRAMUSCULAR; INTRAVENOUS at 15:27

## 2024-03-27 RX ADMIN — ONDANSETRON 4 MG: 2 INJECTION INTRAMUSCULAR; INTRAVENOUS at 15:43

## 2024-03-27 RX ADMIN — SODIUM CHLORIDE, POTASSIUM CHLORIDE, SODIUM LACTATE AND CALCIUM CHLORIDE 9 ML/HR: 600; 310; 30; 20 INJECTION, SOLUTION INTRAVENOUS at 14:13

## 2024-03-27 RX ADMIN — DEXAMETHASONE SODIUM PHOSPHATE 4 MG: 4 INJECTION, SOLUTION INTRAMUSCULAR; INTRAVENOUS at 15:43

## 2024-03-27 RX ADMIN — LIDOCAINE HYDROCHLORIDE 100 MG: 20 INJECTION, SOLUTION INFILTRATION; PERINEURAL at 14:04

## 2024-03-27 RX ADMIN — FENTANYL CITRATE 50 MCG: 50 INJECTION, SOLUTION INTRAMUSCULAR; INTRAVENOUS at 16:59

## 2024-03-27 RX ADMIN — SODIUM CHLORIDE 3000 MG: 900 INJECTION INTRAVENOUS at 14:47

## 2024-03-27 RX ADMIN — FENTANYL CITRATE 50 MCG: 50 INJECTION, SOLUTION INTRAMUSCULAR; INTRAVENOUS at 14:52

## 2024-03-27 RX ADMIN — HYDROMORPHONE HYDROCHLORIDE 1 MG: 1 INJECTION, SOLUTION INTRAMUSCULAR; INTRAVENOUS; SUBCUTANEOUS at 21:44

## 2024-03-27 RX ADMIN — MIDAZOLAM 1 MG: 1 INJECTION INTRAMUSCULAR; INTRAVENOUS at 14:13

## 2024-03-27 RX ADMIN — SODIUM CHLORIDE 100 ML/HR: 9 INJECTION, SOLUTION INTRAVENOUS at 20:30

## 2024-03-27 RX ADMIN — KETOROLAC TROMETHAMINE 30 MG: 30 INJECTION, SOLUTION INTRAMUSCULAR; INTRAVENOUS at 15:43

## 2024-03-27 NOTE — ANESTHESIA PREPROCEDURE EVALUATION
Anesthesia Evaluation     Patient summary reviewed and Nursing notes reviewed   NPO Solid Status: > 8 hours  NPO Liquid Status: > 2 hours           Airway   Mallampati: II  TM distance: >3 FB  Neck ROM: full  No difficulty expected  Comment: Grade IIa view with MAC 4/full beard  Dental      Pulmonary - normal exam    breath sounds clear to auscultation    ROS comment: Recently had Covid a few weeks ago which delayed this procedure  Cardiovascular - normal exam    Rhythm: regular  Rate: normal        Neuro/Psych  (+) psychiatric history Anxiety and Depression    ROS Comment: Dysthymia  GI/Hepatic/Renal/Endo    (+) obesity, GERD, renal disease- stones    ROS Comment: Hx Padilla's/dysphagia    Musculoskeletal     Abdominal   (+) obese   Substance History      OB/GYN          Other      history of cancer      Other Comment: Right renal CA                  Anesthesia Plan    ASA 2     general     intravenous induction     Anesthetic plan, risks, benefits, and alternatives have been provided, discussed and informed consent has been obtained with: patient.    CODE STATUS:

## 2024-03-27 NOTE — ANESTHESIA POSTPROCEDURE EVALUATION
Patient: Lester Benoit    Procedure Summary       Date: 03/27/24 Room / Location: [facility] OR [address] [place] [address] MAIN OR    Anesthesia Start: 1451 Anesthesia Stop: 1617    Procedure: LAPAROSCOPIC RIGHT PARTIAL NEPHRECTOMY (Right: Abdomen) Diagnosis:       Right renal mass      History of renal cell carcinoma      (Right renal mass with a history of renal cell carcinoma)    Surgeons: Frandy Palomares MD Provider: Chris Downey MD    Anesthesia Type: general ASA Status: 2            Anesthesia Type: general    Vitals  Vitals Value Taken Time   /70 03/27/24 1900   Temp     Pulse 63 03/27/24 1911   Resp 16 03/27/24 1730   SpO2 97 % 03/27/24 1911   Vitals shown include unfiled device data.        Anesthesia Post Evaluation

## 2024-03-27 NOTE — H&P
[facility] Surgical History and Physical    Patient Care Team:  Wali Khan MD as PCP - General (Family Medicine)    Chief complaint right renal mass    Subjective     Patient is a 49 y.o. male presents with right renal mass partially exophytic for lap partial nephrectomy.     Review of Systems   Pertinent items are noted in HPI    Past Medical History:   Diagnosis Date    Anxiety     Padilla esophagus     Cancer 07/30/2021    RIGHT KIDNEY CANCER    Cholelithiasis     Depression     GERD (gastroesophageal reflux disease)     History of kidney stones     Right kidney mass      Past Surgical History:   Procedure Laterality Date    COLONOSCOPY N/A 03/22/2023    Procedure: COLONOSCOPY INTO CECUM/ TERMINAL ILEUM WITH POLYPECTOMY X 6, SALINE LIFT X 1, CLIPS X 2;  Surgeon: Anitra Hayes MD;  Location: [facility] ENDOSCOPY;  Service: Gastroenterology;  Laterality: N/A;  pre: screening  post: hemorrhoids, polyps x 6    ENDOSCOPY N/A 04/06/2018    Z-line irregular, 39 cm from the incisors, LA Grade A esophagitis, esophageal mucosal changes suggestive of eosinophilic esophagitis. Dilated, gastritis, duodenitis, 1 cm hiatal hernia    ENDOSCOPY N/A 03/22/2023    Procedure: ESOPHAGOGASTRODUODENOSCOPY WITH BIOPSIES;  Surgeon: Anitra Hayes MD;  Location: [facility] ENDOSCOPY;  Service: Gastroenterology;  Laterality: N/A;  pre: GERD, hx EOE  post: irregular Zline, gastritis, EOE    EXTRACORPOREAL SHOCKWAVE LITHOTRIPSY (ESWL), STENT INSERTION/REMOVAL Right 2021    KNEE ACL RECONSTRUCTION Right 2010    NEPHRECTOMY PARTIAL Right 07/30/2021    Procedure: RIGHT  LAPAROSCOPIC  PARTIAL NEPHRECTOMY;  Surgeon: Frandy Palomares MD;  Location: [facility] OR;  Service: Urology;  Laterality: Right;     Family History   Problem Relation Age of Onset    Lung cancer Father 69    Alcohol abuse Father     Hypertension Mother     Diabetes Paternal Grandmother     Malig Hyperthermia Neg Hx      Social History     Tobacco Use     Smoking status: Never    Smokeless tobacco: Never   Vaping Use    Vaping status: Never Used   Substance Use Topics    Alcohol use: Yes     Comment: Maybe have a beer or bourbon five six times a year    Drug use: No       Meds:  Medications Prior to Admission   Medication Sig Dispense Refill Last Dose    citalopram (CeleXA) 10 MG tablet Take 1 tablet by mouth Daily. 30 tablet 0 3/27/2024    esomeprazole (nexIUM) 40 MG capsule Take 1 capsule by mouth Daily. 90 capsule 3 3/27/2024       Allergies:  Iodinated contrast media    Debilities:  none    Objective     Vital Signs  Temp:  [98.4 °F (36.9 °C)] 98.4 °F (36.9 °C)  Heart Rate:  [67] 67  Resp:  [16] 16  BP: (149)/(103) 149/103  No intake or output data in the 24 hours ending 03/27/24 1347       Physical Exam:     General Appearance:     Alert, cooperative, NAD   HEENT:     No trauma, pupils reactive, hearing intact   Back:      No CVA tenderness   Lungs:      Respirations unlabored, no wheezing    Heart:     RRR, intact peripheral pulses   Abdomen:      Soft, NDNT, no masses, no guarding   :     Phalllus nl   Extremities:    No edema, no deformity   Lymphatic:    No neck or groin LAD   Skin:    No bleeding, bruising or rashes   Neuro/Psych:    Orientation intact, mood/affect pleasant, no focal findings     Results Review:     Results for orders placed or performed in visit on 02/26/24   CBC (No Diff)    Specimen: Blood   Result Value Ref Range    WBC 6.79 3.40 - 10.80 10*3/mm3    RBC 5.45 4.14 - 5.80 10*6/mm3    Hemoglobin 14.5 13.0 - 17.7 g/dL    Hematocrit 43.7 37.5 - 51.0 %    MCV 80.2 79.0 - 97.0 fL    MCH 26.6 26.6 - 33.0 pg    MCHC 33.2 31.5 - 35.7 g/dL    RDW 13.2 12.3 - 15.4 %    RDW-SD 37.7 37.0 - 54.0 fl    MPV 10.2 6.0 - 12.0 fL    Platelets 197 140 - 450 10*3/mm3   Basic Metabolic Panel    Specimen: Blood   Result Value Ref Range    Glucose 82 65 - 99 mg/dL    BUN 11 6 - 20 mg/dL    Creatinine 0.83 0.76 - 1.27 mg/dL    Sodium 138 136 - 145 mmol/L     Potassium 3.7 3.5 - 5.2 mmol/L    Chloride 105 98 - 107 mmol/L    CO2 23.1 22.0 - 29.0 mmol/L    Calcium 9.3 8.6 - 10.5 mg/dL    BUN/Creatinine Ratio 13.3 7.0 - 25.0    Anion Gap 9.9 5.0 - 15.0 mmol/L    eGFR 108.6 >60.0 mL/min/1.73       I reviewed the patient's prior history and old records to the best of my ability.   I have personally reviewed all pertinent imaging myself and their accompanying reports.   I have reviewed all labs.     Assessment:  Right renal mass    Plan:    Right laparoscopic partial nephrectomy    I discussed the patient's findings and my recommendations with patient.   Risks, complications, outcomes and alternatives discussed with the patient at the bedside and office.    Frandy Palomares MD  03/27/24  13:47 EDT

## 2024-03-27 NOTE — ANESTHESIA PROCEDURE NOTES
Airway  Urgency: elective    Date/Time: 3/27/2024 3:03 PM  Airway not difficult    General Information and Staff    Patient location during procedure: OR  Anesthesiologist: Chris Downey MD  CRNA/CAA: Lux Roberson CRNA    Indications and Patient Condition  Indications for airway management: airway protection    Preoxygenated: yes  MILS maintained throughout  Mask difficulty assessment: 1 - vent by mask    Final Airway Details  Final airway type: endotracheal airway      Successful airway: ETT  Cuffed: yes   Successful intubation technique: direct laryngoscopy  Endotracheal tube insertion site: oral  Blade: Sofy  Blade size: 4  ETT size (mm): 7.5  Cormack-Lehane Classification: grade I - full view of glottis  Placement verified by: capnometry   Cuff volume (mL): 7  Measured from: lips  ETT/EBT  to lips (cm): 23  Number of attempts at approach: 1  Assessment: lips, teeth, and gum same as pre-op and atraumatic intubation

## 2024-03-28 ENCOUNTER — READMISSION MANAGEMENT (OUTPATIENT)
Dept: CALL CENTER | Facility: HOSPITAL | Age: 49
End: 2024-03-28
Payer: COMMERCIAL

## 2024-03-28 VITALS
HEART RATE: 61 BPM | SYSTOLIC BLOOD PRESSURE: 118 MMHG | TEMPERATURE: 97.3 F | HEIGHT: 74 IN | DIASTOLIC BLOOD PRESSURE: 72 MMHG | RESPIRATION RATE: 16 BRPM | OXYGEN SATURATION: 96 % | BODY MASS INDEX: 37.23 KG/M2

## 2024-03-28 LAB
ANION GAP SERPL CALCULATED.3IONS-SCNC: 8.1 MMOL/L (ref 5–15)
BUN SERPL-MCNC: 11 MG/DL (ref 6–20)
BUN/CREAT SERPL: 11.6 (ref 7–25)
CALCIUM SPEC-SCNC: 8.5 MG/DL (ref 8.6–10.5)
CHLORIDE SERPL-SCNC: 105 MMOL/L (ref 98–107)
CO2 SERPL-SCNC: 24.9 MMOL/L (ref 22–29)
CREAT SERPL-MCNC: 0.95 MG/DL (ref 0.76–1.27)
DEPRECATED RDW RBC AUTO: 38.5 FL (ref 37–54)
EGFRCR SERPLBLD CKD-EPI 2021: 98.7 ML/MIN/1.73
ERYTHROCYTE [DISTWIDTH] IN BLOOD BY AUTOMATED COUNT: 13.3 % (ref 12.3–15.4)
GLUCOSE SERPL-MCNC: 131 MG/DL (ref 65–99)
HCT VFR BLD AUTO: 40.1 % (ref 37.5–51)
HGB BLD-MCNC: 13.1 G/DL (ref 13–17.7)
MCH RBC QN AUTO: 26.3 PG (ref 26.6–33)
MCHC RBC AUTO-ENTMCNC: 32.7 G/DL (ref 31.5–35.7)
MCV RBC AUTO: 80.4 FL (ref 79–97)
PLATELET # BLD AUTO: 225 10*3/MM3 (ref 140–450)
PMV BLD AUTO: 10.8 FL (ref 6–12)
POTASSIUM SERPL-SCNC: 4.2 MMOL/L (ref 3.5–5.2)
RBC # BLD AUTO: 4.99 10*6/MM3 (ref 4.14–5.8)
SODIUM SERPL-SCNC: 138 MMOL/L (ref 136–145)
WBC NRBC COR # BLD AUTO: 12.13 10*3/MM3 (ref 3.4–10.8)

## 2024-03-28 PROCEDURE — G0378 HOSPITAL OBSERVATION PER HR: HCPCS

## 2024-03-28 PROCEDURE — 80048 BASIC METABOLIC PNL TOTAL CA: CPT | Performed by: UROLOGY

## 2024-03-28 PROCEDURE — 25810000003 SODIUM CHLORIDE 0.9 % SOLUTION: Performed by: UROLOGY

## 2024-03-28 PROCEDURE — 25010000002 HYDROMORPHONE 1 MG/ML SOLUTION: Performed by: UROLOGY

## 2024-03-28 PROCEDURE — 85027 COMPLETE CBC AUTOMATED: CPT | Performed by: UROLOGY

## 2024-03-28 PROCEDURE — 25010000002 CEFAZOLIN 3 G RECONSTITUTED SOLUTION 1 EACH VIAL: Performed by: UROLOGY

## 2024-03-28 RX ORDER — OXYCODONE AND ACETAMINOPHEN 7.5; 325 MG/1; MG/1
1 TABLET ORAL EVERY 4 HOURS PRN
Qty: 30 TABLET | Refills: 0 | Status: SHIPPED | OUTPATIENT
Start: 2024-03-28 | End: 2024-04-07

## 2024-03-28 RX ADMIN — HYDROMORPHONE HYDROCHLORIDE 1 MG: 1 INJECTION, SOLUTION INTRAMUSCULAR; INTRAVENOUS; SUBCUTANEOUS at 04:28

## 2024-03-28 RX ADMIN — SODIUM CHLORIDE 3000 MG: 900 INJECTION INTRAVENOUS at 04:28

## 2024-03-28 RX ADMIN — Medication 3 ML: at 08:42

## 2024-03-28 RX ADMIN — SODIUM CHLORIDE 100 ML/HR: 9 INJECTION, SOLUTION INTRAVENOUS at 06:00

## 2024-03-28 RX ADMIN — SODIUM CHLORIDE 3000 MG: 900 INJECTION INTRAVENOUS at 13:25

## 2024-03-28 RX ADMIN — CITALOPRAM 10 MG: 10 TABLET, FILM COATED ORAL at 08:42

## 2024-03-28 RX ADMIN — OXYCODONE AND ACETAMINOPHEN 1 TABLET: 7.5; 325 TABLET ORAL at 10:20

## 2024-03-28 RX ADMIN — OXYCODONE AND ACETAMINOPHEN 1 TABLET: 7.5; 325 TABLET ORAL at 00:08

## 2024-03-28 RX ADMIN — PANTOPRAZOLE SODIUM 40 MG: 40 TABLET, DELAYED RELEASE ORAL at 06:00

## 2024-03-28 NOTE — PLAN OF CARE
Problem: Adult Inpatient Plan of Care  Goal: Plan of Care Review  Outcome: Ongoing, Progressing  Flowsheets (Taken 3/28/2024 0401)  Progress: improving  Plan of Care Reviewed With: patient  Outcome Evaluation: Pt was a new admission to [facility] on day shift after a R laparoscopic partial nephrectomy for a R kidney mass on 3/27 by Dr. Bryan Palomares, pt has a GONSALO drain to R side, A&Ox4, on room air, pt eating and tolerating diet, NS @100ml/hr, pain meds given per pt request, dixon catheter draining, plan to d/c at 6am per MD order, he is resting off and on tonight, plan of care ongoing.   Goal Outcome Evaluation:  Plan of Care Reviewed With: patient        Progress: improving  Outcome Evaluation: Pt was a new admission to [facility] on day shift after a R laparoscopic partial nephrectomy for a R kidney mass on 3/27 by Dr. Bryan Palomares, pt has a GONSALO drain to R side, A&Ox4, on room air, pt eating and tolerating diet, NS @100ml/hr, pain meds given per pt request, dixon catheter draining, plan to d/c at 6am per MD order, he is resting off and on tonight, plan of care ongoing.

## 2024-03-28 NOTE — CASE MANAGEMENT/SOCIAL WORK
Discharge Planning Assessment  [facility]     Patient Name: Lester Benoit  MRN: [id number]  Today's Date: 3/28/2024    Admit Date: 3/27/2024    Plan: Home with family to transport.   Discharge Needs Assessment       Row Name 03/28/24 1443       Living Environment    People in Home child(alka), dependent;significant other    Current Living Arrangements home    Potentially Unsafe Housing Conditions none    In the past 12 months has the electric, gas, oil, or water company threatened to shut off services in your home? No    Primary Care Provided by self    Provides Primary Care For child(alka)    Family Caregiver if Needed significant other    Family Caregiver Names Nirali MárquezAllen [phone]    Quality of Family Relationships helpful;involved;supportive    Able to Return to Prior Arrangements yes       Resource/Environmental Concerns    Resource/Environmental Concerns none    Transportation Concerns none       Transportation Needs    In the past 12 months, has lack of transportation kept you from medical appointments or from getting medications? no    In the past 12 months, has lack of transportation kept you from meetings, work, or from getting things needed for daily living? No       Food Insecurity    Within the past 12 months, you worried that your food would run out before you got the money to buy more. Never true    Within the past 12 months, the food you bought just didn't last and you didn't have money to get more. Never true       Transition Planning    Patient/Family Anticipates Transition to home with family    Patient/Family Anticipated Services at Transition none    Transportation Anticipated family or friend will provide       Discharge Needs Assessment    Readmission Within the Last 30 Days no previous admission in last 30 days    Equipment Currently Used at Home none    Concerns to be Addressed denies needs/concerns at this time;discharge planning    Anticipated Changes Related to Illness none     Equipment Needed After Discharge none                   Discharge Plan       Row Name 03/28/24 1444       Plan    Plan Home with family to transport.    Roadmap to Recovery Yes    Patient/Family in Agreement with Plan yes    Provided Post Acute Provider List? N/A    N/A Provider List Comment Denies need    Provided Post Acute Provider Quality & Resource List? N/A    N/A Quality & Resource List Comment Denies need    Plan Comments Spoke with patient and spouse at bedside. Introduced self and explained CCP role. Verified face sheet and local pharmacy is [facility]; patient choice to enroll in [facility]. Patient lives in s/s home with a basement with 3 KOBE with spouse and 2 minor children. Patient denies prior HH, SNF, and DME history. Patient plans to return home with family to transport.                  Continued Care and Services - Admitted Since 3/27/2024    No active coordination exists for this encounter.       Expected Discharge Date and Time       Expected Discharge Date Expected Discharge Time    Mar 28, 2024            Demographic Summary       Row Name 03/28/24 1442       General Information    Admission Type observation    Referral Source admission list    Reason for Consult discharge planning    Preferred Language English                   Functional Status       Row Name 03/28/24 1443       Functional Status    Usual Activity Tolerance good    Current Activity Tolerance good       Functional Status, IADL    Medications independent    Meal Preparation independent    Housekeeping independent    Laundry independent    Shopping independent       Mental Status    General Appearance WDL WDL       Mental Status Summary    Recent Changes in Mental Status/Cognitive Functioning no changes       Employment/    Employment Status employed full-time    Shift Worked first shift    Current or Previous Occupation desk job;healthcare                   Psychosocial    No documentation.                  Abuse/Neglect     No documentation.                  Legal    No documentation.                  Substance Abuse    No documentation.                  Patient Forms    No documentation.                     Marjorie Calderon RN

## 2024-03-28 NOTE — OUTREACH NOTE
Prep Survey      Flowsheet Row Responses   [facility] patient discharged from? [facility]   Is LACE score < 7 ? Yes   Eligibility [facility]   Date of Admission 03/27/24   Date of Discharge 03/28/24   Discharge Disposition Home or Self Care   Discharge diagnosis Right kidney mass,  LAPAROSCOPIC RIGHT PARTIAL NEPHRECTOMY   Does the patient have one of the following disease processes/diagnoses(primary or secondary)? General Surgery   Does the patient have Home health ordered? No   Is there a DME ordered? No   Prep survey completed? Yes            Jaqui BASHIR - Registered Nurse

## 2024-03-29 ENCOUNTER — TELEPHONE (OUTPATIENT)
Dept: INTERNAL MEDICINE | Facility: CLINIC | Age: 49
End: 2024-03-29

## 2024-03-29 ENCOUNTER — TELEPHONE (OUTPATIENT)
Dept: INTERNAL MEDICINE | Facility: CLINIC | Age: 49
End: 2024-03-29
Payer: COMMERCIAL

## 2024-03-29 ENCOUNTER — TRANSITIONAL CARE MANAGEMENT TELEPHONE ENCOUNTER (OUTPATIENT)
Dept: CALL CENTER | Facility: HOSPITAL | Age: 49
End: 2024-03-29
Payer: COMMERCIAL

## 2024-03-29 RX ORDER — CITALOPRAM HYDROBROMIDE 10 MG/1
10 TABLET ORAL DAILY
Qty: 30 TABLET | Refills: 0 | OUTPATIENT
Start: 2024-03-29

## 2024-03-29 NOTE — CASE MANAGEMENT/SOCIAL WORK
Case Management Discharge Note      Final Note: Discharged home. Orders reviewed. No further discharge needs.    Provided Post Acute Provider List?: N/A  N/A Provider List Comment: Denies need  Provided Post Acute Provider Quality & Resource List?: N/A  N/A Quality & Resource List Comment: Denies need    Selected Continued Care - Discharged on 3/28/2024 Admission date: 3/27/2024 - Discharge disposition: Home or Self Care      Destination    No services have been selected for the patient.                Durable Medical Equipment    No services have been selected for the patient.                Dialysis/Infusion    No services have been selected for the patient.                Home Medical Care    No services have been selected for the patient.                Therapy    No services have been selected for the patient.                Community Resources    No services have been selected for the patient.                Community & DME    No services have been selected for the patient.                         Final Discharge Disposition Code: 01 - home or self-care

## 2024-03-29 NOTE — OUTREACH NOTE
Call Center TCM Note      Flowsheet Row Responses   [facility] patient discharged from? [place]   Does the patient have one of the following disease processes/diagnoses(primary or secondary)? General Surgery   TCM attempt successful? Yes   Call start time 1448   Call end time 1450   Discharge diagnosis Right kidney mass,  LAPAROSCOPIC RIGHT PARTIAL NEPHRECTOMY   Meds reviewed with patient/caregiver? Yes   Is the patient having any side effects they believe may be caused by any medication additions or changes? No   Does the patient have all medications related to this admission filled (includes all antibiotics, pain medications, etc.) Yes   Is the patient taking all medications as directed (includes completed medication regime)? Yes   Does the patient have an appointment with their PCP within 7-14 days of discharge? No   Nursing Interventions Patient declined scheduling/rescheduling appointment at this time   Has home health visited the patient within 72 hours of discharge? N/A   Psychosocial issues? No   Did the patient receive a copy of their discharge instructions? Yes   Nursing interventions Reviewed instructions with patient   What is the patient's perception of their health status since discharge? Improving   Nursing interventions Nurse provided patient education   Is the patient /caregiver able to teach back basic post-op care? Drive as instructed by MD in discharge instructions, Take showers only when approved by MD-sponge bathe until then, Lifting as instructed by MD in discharge instructions   Is the patient/caregiver able to teach back signs and symptoms of incisional infection? Increased redness, swelling or pain at the incisonal site, Increased drainage or bleeding, Incisional warmth, Pus or odor from incision, Fever   Is the patient/caregiver able to teach back steps to recovery at home? Set small, achievable goals for return to baseline health, Rest and rebuild strength, gradually increase  activity, Make a list of questions for surgeon's appointment   If the patient is a current smoker, are they able to teach back resources for cessation? Not a smoker   Is the patient/caregiver able to teach back the hierarchy of who to call/visit for symptoms/problems? PCP, Specialist, Home health nurse, Urgent Care, ED, 911 Yes   TCM call completed? Yes   Call end time 1450   Would this patient benefit from a Referral to [facility] Social Work? No   Is the patient interested in additional calls from an ambulatory ? No            Malia Barbosa LPN    3/29/2024, 14:52 EDT

## 2024-03-29 NOTE — TELEPHONE ENCOUNTER
Caller: Lester Benoit    Relationship: Self    Best call back number: [phone]    Requested Prescriptions:   Requested Prescriptions     Pending Prescriptions Disp Refills    citalopram (CeleXA) 10 MG tablet 30 tablet 0     Sig: Take 1 tablet by mouth Daily.        Pharmacy where request should be sent: [facility]     Last office visit with prescribing clinician: 9/22/2022   Last telemedicine visit with prescribing clinician: Visit date not found   Next office visit with prescribing clinician: Visit date not found     Additional details provided by patient: PATIENT HAS SIX TABLETS    Does the patient have less than a 3 day supply:  [] Yes  [x] No    Would you like a call back once the refill request has been completed: [] Yes [x] No    If the office needs to give you a call back, can they leave a voicemail: [] Yes [x] No    Bernadette Michaels Rep   03/29/24 09:13 EDT

## 2024-03-29 NOTE — TELEPHONE ENCOUNTER
Caller: Lester Benoit    Relationship: Self    Best call back number: [phone]    What is the best time to reach you: ANY    Who are you requesting to speak with (clinical staff, provider,  specific staff member): CLINICAL    Do you know the name of the person who called: PATIENT    What was the call regarding: REMOVED MASS OF RIGHT KIDNEY.    PATIENT STATED EVERYTHING WAS FINE.    Is it okay if the provider responds through MyChart: YES

## 2024-04-01 LAB
LAB AP CASE REPORT: NORMAL
LAB AP SPECIAL STAINS: NORMAL
LAB AP SYNOPTIC CHECKLIST: NORMAL
PATH REPORT.FINAL DX SPEC: NORMAL
PATH REPORT.GROSS SPEC: NORMAL

## 2024-04-04 ENCOUNTER — OFFICE VISIT (OUTPATIENT)
Dept: INTERNAL MEDICINE | Facility: CLINIC | Age: 49
End: 2024-04-04
Payer: COMMERCIAL

## 2024-04-04 VITALS
TEMPERATURE: 98.5 F | OXYGEN SATURATION: 96 % | HEART RATE: 86 BPM | HEIGHT: 74 IN | DIASTOLIC BLOOD PRESSURE: 84 MMHG | BODY MASS INDEX: 36.63 KG/M2 | WEIGHT: 285.4 LBS | SYSTOLIC BLOOD PRESSURE: 126 MMHG

## 2024-04-04 DIAGNOSIS — M54.50 ACUTE LEFT-SIDED LOW BACK PAIN WITHOUT SCIATICA: ICD-10-CM

## 2024-04-04 DIAGNOSIS — F34.1 DYSTHYMIA: Primary | ICD-10-CM

## 2024-04-04 PROBLEM — R31.0 GROSS HEMATURIA: Status: RESOLVED | Noted: 2021-02-09 | Resolved: 2024-04-04

## 2024-04-04 PROCEDURE — 99214 OFFICE O/P EST MOD 30 MIN: CPT | Performed by: FAMILY MEDICINE

## 2024-04-04 RX ORDER — CITALOPRAM HYDROBROMIDE 10 MG/1
10 TABLET ORAL DAILY
Qty: 90 TABLET | Refills: 2 | Status: SHIPPED | OUTPATIENT
Start: 2024-04-04

## 2024-04-04 RX ORDER — TIZANIDINE 2 MG/1
2 TABLET ORAL EVERY 8 HOURS PRN
Qty: 30 TABLET | Refills: 1 | Status: SHIPPED | OUTPATIENT
Start: 2024-04-04

## 2024-04-04 NOTE — PROGRESS NOTES
"Chief Complaint  Back Pain (Pulled muscle ) and Med Refill    Subjective        Lester Benoit presents to [facility]  Back Pain      Patient follows up for needing refill on his Celexa is doing well no unwanted side effects some increased stressors with work him medical concerns.  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    Weight as of this encounter: 129 kg (285 lb 6.4 oz).             Physical Exam  Constitutional:       Appearance: He is obese.   HENT:      Head: Normocephalic and atraumatic.   Abdominal:      Tenderness: There is no abdominal tenderness. There is no right CVA tenderness or left CVA tenderness.   Neurological:      Mental Status: He is alert.     Trocar entry point is clean left flank  Result Review :      Common labs          2/26/2024    08:52 2/26/2024    15:48 3/28/2024    05:03   Common Labs   Glucose  82  131    BUN  11  11    Creatinine  0.83  0.95    Sodium  138  138    Potassium  3.7  4.2    Chloride  105  105    Calcium  9.3  8.5    WBC 6.79   12.13    Hemoglobin 14.5   13.1    Hematocrit 43.7   40.1    Platelets 197   225                   Assessment and Plan     Diagnoses and all orders for this visit:    1. Dysthymia (Primary)    2. Acute left-sided low back pain without sciatica    Other orders  -     citalopram (CeleXA) 10 MG tablet; Take 1 tablet by mouth Daily.  Dispense: 90 tablet; " Refill: 2  -     tiZANidine (ZANAFLEX) 2 MG tablet; Take 1 tablet by mouth Every 8 (Eight) Hours As Needed for Muscle Spasms.  Dispense: 30 tablet; Refill: 1    Patient declines physical therapy he has follow-up with urology restart citalopram mL inadvertently discontinued with recent hospitalization and discharge  Trial of tizanidine for low back pain         Follow Up     Return in about 6 months (around 10/4/2024), or if symptoms worsen or fail to improve, for Recheck.  Patient was given instructions and counseling regarding his condition or for health maintenance advice. Please see specific information pulled into the AVS if appropriate.

## 2024-04-30 ENCOUNTER — CONSULT (OUTPATIENT)
Dept: ONCOLOGY | Facility: CLINIC | Age: 49
End: 2024-04-30
Payer: COMMERCIAL

## 2024-04-30 ENCOUNTER — LAB (OUTPATIENT)
Dept: LAB | Facility: HOSPITAL | Age: 49
End: 2024-04-30
Payer: COMMERCIAL

## 2024-04-30 VITALS
DIASTOLIC BLOOD PRESSURE: 88 MMHG | HEIGHT: 74 IN | SYSTOLIC BLOOD PRESSURE: 122 MMHG | OXYGEN SATURATION: 97 % | WEIGHT: 286 LBS | HEART RATE: 70 BPM | TEMPERATURE: 98.6 F | BODY MASS INDEX: 36.7 KG/M2

## 2024-04-30 DIAGNOSIS — N28.89 RIGHT RENAL MASS: ICD-10-CM

## 2024-04-30 DIAGNOSIS — N28.89 RIGHT RENAL MASS: Primary | ICD-10-CM

## 2024-04-30 DIAGNOSIS — C64.1 CLEAR CELL RENAL CELL CARCINOMA, RIGHT: ICD-10-CM

## 2024-04-30 DIAGNOSIS — C64.9 RENAL CELL CARCINOMA, UNSPECIFIED LATERALITY: Primary | ICD-10-CM

## 2024-04-30 LAB
BASOPHILS # BLD AUTO: 0.04 10*3/MM3 (ref 0–0.2)
BASOPHILS NFR BLD AUTO: 0.6 % (ref 0–1.5)
DEPRECATED RDW RBC AUTO: 41.1 FL (ref 37–54)
EOSINOPHIL # BLD AUTO: 0.31 10*3/MM3 (ref 0–0.4)
EOSINOPHIL NFR BLD AUTO: 4.5 % (ref 0.3–6.2)
ERYTHROCYTE [DISTWIDTH] IN BLOOD BY AUTOMATED COUNT: 13.8 % (ref 12.3–15.4)
HCT VFR BLD AUTO: 43.5 % (ref 37.5–51)
HGB BLD-MCNC: 14.2 G/DL (ref 13–17.7)
IMM GRANULOCYTES # BLD AUTO: 0.03 10*3/MM3 (ref 0–0.05)
IMM GRANULOCYTES NFR BLD AUTO: 0.4 % (ref 0–0.5)
LYMPHOCYTES # BLD AUTO: 2.53 10*3/MM3 (ref 0.7–3.1)
LYMPHOCYTES NFR BLD AUTO: 36.9 % (ref 19.6–45.3)
MCH RBC QN AUTO: 26.9 PG (ref 26.6–33)
MCHC RBC AUTO-ENTMCNC: 32.6 G/DL (ref 31.5–35.7)
MCV RBC AUTO: 82.4 FL (ref 79–97)
MONOCYTES # BLD AUTO: 0.39 10*3/MM3 (ref 0.1–0.9)
MONOCYTES NFR BLD AUTO: 5.7 % (ref 5–12)
NEUTROPHILS NFR BLD AUTO: 3.56 10*3/MM3 (ref 1.7–7)
NEUTROPHILS NFR BLD AUTO: 51.9 % (ref 42.7–76)
NRBC BLD AUTO-RTO: 0 /100 WBC (ref 0–0.2)
PLATELET # BLD AUTO: 194 10*3/MM3 (ref 140–450)
PMV BLD AUTO: 10.8 FL (ref 6–12)
RBC # BLD AUTO: 5.28 10*6/MM3 (ref 4.14–5.8)
WBC NRBC COR # BLD AUTO: 6.86 10*3/MM3 (ref 3.4–10.8)

## 2024-04-30 PROCEDURE — 85025 COMPLETE CBC W/AUTO DIFF WBC: CPT

## 2024-04-30 PROCEDURE — 36415 COLL VENOUS BLD VENIPUNCTURE: CPT

## 2024-04-30 NOTE — PROGRESS NOTES
[facility]    CONSULTING IN BLOOD DISORDERS & CANCER      REASON FOR CONSULTATION/CHIEF COMPLAINT:     Evaluation and management for right kidney cancer                             REQUESTING PHYSICIAN: Frandy Palomares MD  RECORDS OBTAINED:  Records of the patients history including those from the electronic medical record were reviewed and summarized in detail.    HISTORY OF PRESENT ILLNESS:    The patient is a 49 y.o. year old male with medical history significant for GERD, Padilla's esophagus and recurrent kidney stones was noted to have a right kidney lesion (2.1 cm solid and relatively hypervascular mass arising from the lateral parenchyma of the interpolar right kidney) while on a routine imaging for kidney stone evaluation.    Patient was seen by Dr. Bryan Palomares, urology who performed right laparoscopic partial nephrectomy on 7/30/2021.  Pathology: Chromophobe RCC.  2.7 cm.  Grade 2.  Margins negative. pT3a    Patient was kept on active surveillance.    A MRI abdomen from 1/14/2024 revealed a right anterior mid zone renal mass with enhancement measuring 1.8 x 1.3 cm concerning for renal cell carcinoma.    Patient underwent right laparoscopic partial nephrectomy on 3/27/2024.  Pathology: Clear-cell renal cell carcinoma.  1.5 cm.  Grade 3.  All margins negative.  No rhabdoid/sarcomatoid features. pT1a    Patient has been referred to this clinic for further evaluation and management.  No history of tobacco/alcohol/drug abuse.  Family history significant for father with history of lung and brain cancer, was a heavy smoker.    Past Medical History:   Diagnosis Date    Anxiety     Padilla esophagus     Cancer 07/30/2021    RIGHT KIDNEY CANCER    Cholelithiasis     Depression     GERD (gastroesophageal reflux disease)     History of kidney stones     Right kidney mass      Past Surgical History:   Procedure Laterality Date    COLONOSCOPY N/A 03/22/2023    Procedure: COLONOSCOPY INTO CECUM/ TERMINAL ILEUM WITH  POLYPECTOMY X 6, SALINE LIFT X 1, CLIPS X 2;  Surgeon: Anitra Hayes MD;  Location: [facility] ENDOSCOPY;  Service: Gastroenterology;  Laterality: N/A;  pre: screening  post: hemorrhoids, polyps x 6    ENDOSCOPY N/A 04/06/2018    Z-line irregular, 39 cm from the incisors, LA Grade A esophagitis, esophageal mucosal changes suggestive of eosinophilic esophagitis. Dilated, gastritis, duodenitis, 1 cm hiatal hernia    ENDOSCOPY N/A 03/22/2023    Procedure: ESOPHAGOGASTRODUODENOSCOPY WITH BIOPSIES;  Surgeon: Anitra Hayes MD;  Location: [facility] ENDOSCOPY;  Service: Gastroenterology;  Laterality: N/A;  pre: GERD, hx EOE  post: irregular Zline, gastritis, EOE    EXTRACORPOREAL SHOCKWAVE LITHOTRIPSY (ESWL), STENT INSERTION/REMOVAL Right 2021    KNEE ACL RECONSTRUCTION Right 2010    NEPHRECTOMY PARTIAL Right 07/30/2021    Procedure: RIGHT  LAPAROSCOPIC  PARTIAL NEPHRECTOMY;  Surgeon: Frandy Palomares MD;  Location: [facility] MAIN OR;  Service: Urology;  Laterality: Right;    NEPHRECTOMY PARTIAL Right 3/27/2024    Procedure: LAPAROSCOPIC RIGHT PARTIAL NEPHRECTOMY;  Surgeon: Frandy Palomares MD;  Location: [facility] MAIN OR;  Service: Urology;  Laterality: Right;       MEDICATIONS    Current Outpatient Medications:     citalopram (CeleXA) 10 MG tablet, Take 1 tablet by mouth Daily., Disp: 90 tablet, Rfl: 2    esomeprazole (nexIUM) 40 MG capsule, Take 1 capsule by mouth Daily., Disp: 90 capsule, Rfl: 3    tiZANidine (ZANAFLEX) 2 MG tablet, Take 1 tablet by mouth Every 8 (Eight) Hours As Needed for Muscle Spasms., Disp: 30 tablet, Rfl: 1    ALLERGIES:     Allergies   Allergen Reactions    Iodinated Contrast Media Anaphylaxis       SOCIAL HISTORY:       Social History     Socioeconomic History    Marital status: Single     Spouse name: Miriam    Number of children: 3   Tobacco Use    Smoking status: Never    Smokeless tobacco: Never   Vaping Use    Vaping status: Never Used   Substance and Sexual Activity    Alcohol use: Yes  "    Comment: Maybe have a beer or bourbon five six times a year    Drug use: No    Sexual activity: Yes     Partners: Female     Birth control/protection: Vasectomy         FAMILY HISTORY:  Family History   Problem Relation Age of Onset    Lung cancer Father 69    Alcohol abuse Father     Hypertension Mother     Diabetes Paternal Grandmother     Malig Hyperthermia Neg Hx        REVIEW OF SYSTEMS:  As per HPI         Vitals:    04/30/24 1536   BP: 122/88   Pulse: 70   Temp: 98.6 °F (37 °C)   TempSrc: Temporal   SpO2: 97%   Weight: 130 kg (286 lb)   Height: 186.7 cm (73.5\")         4/30/2024     3:35 PM   Current Status   ECOG score 0      PHYSICAL EXAM:    CONSTITUTIONAL:  Vital signs reviewed.  No distress, looks comfortable.  EYES:  Conjunctiva and lids unremarkable.   EARS,NOSE,MOUTH,THROAT:  Ears and nose appear unremarkable.  Lips, teeth, gums appear unremarkable.  RESPIRATORY:  Normal respiratory effort.  Lungs clear to auscultation bilaterally.  CARDIOVASCULAR:  Normal S1, S2.  No murmurs rubs or gallops.  No significant lower extremity edema.  GASTROINTESTINAL: Abdomen appears unremarkable.  Not distended  LYMPHATIC:  No cervical, supraclavicular lymphadenopathy.  NEURO: AAOx3, no focal deficits.  Appears to have equal strength all 4 extremities.  MUSCULOSKELETAL:  Unremarkable digits/nails.  No cyanosis or clubbing.  No apparent joint deformities.  SKIN:  Warm.  No rashes.  PSYCHIATRIC:  Normal judgment and insight.  Normal mood and affect.     RECENT LABS:        Lab on 04/30/2024   Component Date Value Ref Range Status    WBC 04/30/2024 6.86  3.40 - 10.80 10*3/mm3 Final    RBC 04/30/2024 5.28  4.14 - 5.80 10*6/mm3 Final    Hemoglobin 04/30/2024 14.2  13.0 - 17.7 g/dL Final    Hematocrit 04/30/2024 43.5  37.5 - 51.0 % Final    MCV 04/30/2024 82.4  79.0 - 97.0 fL Final    MCH 04/30/2024 26.9  26.6 - 33.0 pg Final    MCHC 04/30/2024 32.6  31.5 - 35.7 g/dL Final    RDW 04/30/2024 13.8  12.3 - 15.4 % Final    " RDW-SD 04/30/2024 41.1  37.0 - 54.0 fl Final    MPV 04/30/2024 10.8  6.0 - 12.0 fL Final    Platelets 04/30/2024 194  140 - 450 10*3/mm3 Final    Neutrophil % 04/30/2024 51.9  42.7 - 76.0 % Final    Lymphocyte % 04/30/2024 36.9  19.6 - 45.3 % Final    Monocyte % 04/30/2024 5.7  5.0 - 12.0 % Final    Eosinophil % 04/30/2024 4.5  0.3 - 6.2 % Final    Basophil % 04/30/2024 0.6  0.0 - 1.5 % Final    Immature Grans % 04/30/2024 0.4  0.0 - 0.5 % Final    Neutrophils, Absolute 04/30/2024 3.56  1.70 - 7.00 10*3/mm3 Final    Lymphocytes, Absolute 04/30/2024 2.53  0.70 - 3.10 10*3/mm3 Final    Monocytes, Absolute 04/30/2024 0.39  0.10 - 0.90 10*3/mm3 Final    Eosinophils, Absolute 04/30/2024 0.31  0.00 - 0.40 10*3/mm3 Final    Basophils, Absolute 04/30/2024 0.04  0.00 - 0.20 10*3/mm3 Final    Immature Grans, Absolute 04/30/2024 0.03  0.00 - 0.05 10*3/mm3 Final    nRBC 04/30/2024 0.0  0.0 - 0.2 /100 WBC Final         ASSESSMENT:  Patient is a pleasant 49-year-old male with medical history significant for GERD, Padilla's esophagus and recurrent kidney stones who comes for right kidney cancer evaluation and management.      # Right kidney cancers, chromophobe s/p resection (July 2021) and clear-cell type (March 2024):   Patient was noted to have a right kidney lesion (2.1 cm solid and relatively hypervascular mass arising from the lateral parenchyma of the interpolar right kidney) while on a routine imaging for kidney stone evaluation.    Patient was seen by Dr. Bryan Palomares, urology who performed right laparoscopic partial nephrectomy on 7/30/2021.  Pathology: Chromophobe RCC.  2.7 cm.  Grade 2.  Margins negative. pT3a  Patient was kept on active surveillance.   A MRI abdomen from 1/14/2024 revealed a right anterior mid zone renal mass with enhancement measuring 1.8 x 1.3 cm concerning for renal cell carcinoma.   Patient underwent right laparoscopic partial nephrectomy on 3/27/2024. Pathology: Clear-cell renal cell carcinoma.   1.5 cm.  Grade 3.  All margins negative.  No rhabdoid/sarcomatoid features. pT1a  I reviewed the imaging and pathologic findings with patient and his wife. I reviewed the data from the keynote 564 trial which showed improvement in the median overall survival with 38% reduction in the risk of death compared with placebo with at event pembrolizumab. At a median follow-up of 57.2 months, OS in the pembrolizumab arm was 91.2% versus 86.0% in the placebo arm (HR 0.62, 95% CI [0.44, 0.87]; P = .002).  The benefit was mainly seen in intermediate high and high risk of recurrence ccRCC patients.  There has been other multiple adjuvant studies with TKI's and immune checkpoint inhibitors-all of which have shown no significant clinical benefit.  In this patient with pT1a and grade 3 ccRCC, the benefit with adjuvant pembrolizumab in reducing risk of recurrence and improving survival is unclear.  These patients were not included in the keynote 564 clinical trial.  Discussed plan for continued active surveillance.  Patient states she is scheduled for repeat scans through Dr. Palomares's office in October 2024.  Will see him back after the scans.    # Encounter for genetic screening  Given his relatively young age for developing 2 different types of kidney cancer, will check [facility] germline testing    PLAN:   -No benefit with adjuvant pembrolizumab  -Continue active surveillance  -Check [facility] germline testing  -Advised close follow-up with Dr. Palomaers for surveillance scans  -Follow-up in 6 months or sooner if needed    Orders Placed This Encounter   Procedures    [facility] - Miscellaneous Test     Standing Status:   Future     Number of Occurrences:   1     Standing Expiration Date:   4/30/2025     Order Specific Question:   What is the name of the test you wish to perform?     Answer:   [facility]     Order Specific Question:   Release to patient     Answer:   Routine Release [[id number]]   Total time spent during this patient encounter  is 65 minutes. The total time spent with the patient includes: preparing to see the patient by reviewing of tests, prior notes or other relevant information, performing appropriate independent examination & evaluation, counseling, ordering of medications, tests or procedures, documenting clinic information in the electronic medical records or other health records, independently interpreting results of tests and communicating the results to the patient/family or caregiver.

## 2024-05-09 LAB — REF LAB TEST RESULTS: NORMAL

## 2024-06-11 ENCOUNTER — TELEPHONE (OUTPATIENT)
Dept: ONCOLOGY | Facility: CLINIC | Age: 49
End: 2024-06-11
Payer: COMMERCIAL

## 2024-06-11 NOTE — TELEPHONE ENCOUNTER
Caller: Lester Benoit    Relationship: Self    Best call back number: [phone]     Who are you requesting to speak with (clinical staff, provider,  specific staff member): NON CLINICAL    What was the call regarding: PATIENT WAS SENT OUT FOR [facility] GENETIC TESTING.    PATIENT STATES THAT MD VELA ESTIMATED OUT OF POCKET COST @ $150.    PATIENT HAS QUESTIONS REGARDING BILLING RECEIVED

## 2024-06-12 NOTE — TELEPHONE ENCOUNTER
Called patient. He stated that he received a bill from [employer] who is charging him $892.43. Patient advised to reach out to [employer]. Will send a [employer] message for the link he is to visit. Patient informed to keep us in touch, direct line given. Wendy of [employer] notified.    06/24/24 Called patient no answer. LVM that [employer] message was previously sent. Direct line given as well for any further questions.

## 2024-10-28 ENCOUNTER — OFFICE VISIT (OUTPATIENT)
Dept: ONCOLOGY | Facility: CLINIC | Age: 49
End: 2024-10-28
Payer: COMMERCIAL

## 2024-10-28 ENCOUNTER — TELEMEDICINE (OUTPATIENT)
Dept: FAMILY MEDICINE CLINIC | Facility: TELEHEALTH | Age: 49
End: 2024-10-28
Payer: COMMERCIAL

## 2024-10-28 ENCOUNTER — LAB (OUTPATIENT)
Dept: LAB | Facility: HOSPITAL | Age: 49
End: 2024-10-28
Payer: COMMERCIAL

## 2024-10-28 VITALS
HEART RATE: 73 BPM | DIASTOLIC BLOOD PRESSURE: 85 MMHG | OXYGEN SATURATION: 96 % | SYSTOLIC BLOOD PRESSURE: 134 MMHG | WEIGHT: 282.1 LBS | HEIGHT: 73 IN | BODY MASS INDEX: 37.39 KG/M2 | TEMPERATURE: 97.9 F

## 2024-10-28 DIAGNOSIS — N28.89 RIGHT RENAL MASS: Primary | ICD-10-CM

## 2024-10-28 DIAGNOSIS — C64.1 CLEAR CELL RENAL CELL CARCINOMA, RIGHT: ICD-10-CM

## 2024-10-28 DIAGNOSIS — J40 BRONCHITIS: Primary | ICD-10-CM

## 2024-10-28 DIAGNOSIS — N28.89 RIGHT RENAL MASS: ICD-10-CM

## 2024-10-28 LAB
ALBUMIN SERPL-MCNC: 4.1 G/DL (ref 3.5–5.2)
ALBUMIN/GLOB SERPL: 1.2 G/DL
ALP SERPL-CCNC: 84 U/L (ref 39–117)
ALT SERPL W P-5'-P-CCNC: 25 U/L (ref 1–41)
ANION GAP SERPL CALCULATED.3IONS-SCNC: 10.4 MMOL/L (ref 5–15)
AST SERPL-CCNC: 26 U/L (ref 1–40)
BASOPHILS # BLD AUTO: 0.04 10*3/MM3 (ref 0–0.2)
BASOPHILS NFR BLD AUTO: 0.5 % (ref 0–1.5)
BILIRUB SERPL-MCNC: 0.5 MG/DL (ref 0–1.2)
BUN SERPL-MCNC: 12 MG/DL (ref 6–20)
BUN/CREAT SERPL: 13.8 (ref 7–25)
CALCIUM SPEC-SCNC: 9.2 MG/DL (ref 8.6–10.5)
CHLORIDE SERPL-SCNC: 104 MMOL/L (ref 98–107)
CO2 SERPL-SCNC: 26.6 MMOL/L (ref 22–29)
CREAT SERPL-MCNC: 0.87 MG/DL (ref 0.76–1.27)
DEPRECATED RDW RBC AUTO: 41.6 FL (ref 37–54)
EGFRCR SERPLBLD CKD-EPI 2021: 106.4 ML/MIN/1.73
EOSINOPHIL # BLD AUTO: 0.31 10*3/MM3 (ref 0–0.4)
EOSINOPHIL NFR BLD AUTO: 4 % (ref 0.3–6.2)
ERYTHROCYTE [DISTWIDTH] IN BLOOD BY AUTOMATED COUNT: 13.5 % (ref 12.3–15.4)
GLOBULIN UR ELPH-MCNC: 3.3 GM/DL
GLUCOSE SERPL-MCNC: 103 MG/DL (ref 65–99)
HCT VFR BLD AUTO: 48.3 % (ref 37.5–51)
HGB BLD-MCNC: 15.7 G/DL (ref 13–17.7)
IMM GRANULOCYTES # BLD AUTO: 0.03 10*3/MM3 (ref 0–0.05)
IMM GRANULOCYTES NFR BLD AUTO: 0.4 % (ref 0–0.5)
LYMPHOCYTES # BLD AUTO: 2.23 10*3/MM3 (ref 0.7–3.1)
LYMPHOCYTES NFR BLD AUTO: 29.1 % (ref 19.6–45.3)
MCH RBC QN AUTO: 27.5 PG (ref 26.6–33)
MCHC RBC AUTO-ENTMCNC: 32.5 G/DL (ref 31.5–35.7)
MCV RBC AUTO: 84.6 FL (ref 79–97)
MONOCYTES # BLD AUTO: 0.6 10*3/MM3 (ref 0.1–0.9)
MONOCYTES NFR BLD AUTO: 7.8 % (ref 5–12)
NEUTROPHILS NFR BLD AUTO: 4.46 10*3/MM3 (ref 1.7–7)
NEUTROPHILS NFR BLD AUTO: 58.2 % (ref 42.7–76)
NRBC BLD AUTO-RTO: 0 /100 WBC (ref 0–0.2)
PLATELET # BLD AUTO: 194 10*3/MM3 (ref 140–450)
PMV BLD AUTO: 10.3 FL (ref 6–12)
POTASSIUM SERPL-SCNC: 4.4 MMOL/L (ref 3.5–5.2)
PROT SERPL-MCNC: 7.4 G/DL (ref 6–8.5)
RBC # BLD AUTO: 5.71 10*6/MM3 (ref 4.14–5.8)
SODIUM SERPL-SCNC: 141 MMOL/L (ref 136–145)
WBC NRBC COR # BLD AUTO: 7.67 10*3/MM3 (ref 3.4–10.8)

## 2024-10-28 PROCEDURE — 85025 COMPLETE CBC W/AUTO DIFF WBC: CPT

## 2024-10-28 PROCEDURE — 36415 COLL VENOUS BLD VENIPUNCTURE: CPT

## 2024-10-28 PROCEDURE — 99213 OFFICE O/P EST LOW 20 MIN: CPT | Performed by: NURSE PRACTITIONER

## 2024-10-28 PROCEDURE — 80053 COMPREHEN METABOLIC PANEL: CPT

## 2024-10-28 PROCEDURE — 99215 OFFICE O/P EST HI 40 MIN: CPT | Performed by: INTERNAL MEDICINE

## 2024-10-28 RX ORDER — PREDNISONE 20 MG/1
20 TABLET ORAL 2 TIMES DAILY
Qty: 14 TABLET | Refills: 0 | Status: SHIPPED | OUTPATIENT
Start: 2024-10-28 | End: 2024-11-05

## 2024-10-28 RX ORDER — BENZONATATE 100 MG/1
100 CAPSULE ORAL 3 TIMES DAILY PRN
Qty: 21 CAPSULE | Refills: 0 | Status: SHIPPED | OUTPATIENT
Start: 2024-10-28 | End: 2024-11-05

## 2024-10-28 RX ORDER — ALBUTEROL SULFATE 90 UG/1
2 INHALANT RESPIRATORY (INHALATION) EVERY 4 HOURS PRN
Qty: 8.5 G | Refills: 0 | Status: SHIPPED | OUTPATIENT
Start: 2024-10-28

## 2024-10-28 NOTE — PROGRESS NOTES
Subjective   Lester Benoit is a 49 y.o. male.     History of Present Illness  He has had a cough for 4 days that is not getting better and is very harsh. Mucinex, nyquil. His fiance has similar symptoms.        The following portions of the patient's history were reviewed and updated as appropriate: allergies, current medications, past family history, past medical history, past social history, past surgical history, and problem list.    Review of Systems   Constitutional:  Negative for fever.   HENT:  Positive for sore throat.    Respiratory:  Positive for cough and chest tightness.    Gastrointestinal: Negative.    Musculoskeletal:  Negative for myalgias.   Neurological:  Positive for headache.       Objective   Physical Exam  Constitutional:       General: He is not in acute distress.     Appearance: He is well-developed. He is not diaphoretic.   Pulmonary:      Effort: Pulmonary effort is normal.   Neurological:      Mental Status: He is alert and oriented to person, place, and time.   Psychiatric:         Behavior: Behavior normal.           Assessment & Plan   Diagnoses and all orders for this visit:    1. Bronchitis (Primary)  -     albuterol sulfate  (90 Base) MCG/ACT inhaler; Inhale 2 puffs Every 4 (Four) Hours As Needed for Wheezing.  Dispense: 8.5 g; Refill: 0  -     predniSONE (DELTASONE) 20 MG tablet; Take 1 tablet by mouth 2 (Two) Times a Day for 7 days.  Dispense: 14 tablet; Refill: 0  -     benzonatate (Tessalon Perles) 100 MG capsule; Take 1 capsule by mouth 3 (Three) Times a Day As Needed for Cough for up to 7 days.  Dispense: 21 capsule; Refill: 0                 The use of a video visit has been reviewed with the patient and verbal informed consent has been obtained. Myself and Lester Benoit participated in this visit. The patient is located in  [place], [place] . I am located in [place], [place]. [employer] and [employer] Video Client were utilized. I spent 20 minutes in the patient's chart for this  visit.

## 2024-10-28 NOTE — PROGRESS NOTES
[facility]    CONSULTING IN BLOOD DISORDERS & CANCER      REASON FOR CONSULTATION/CHIEF COMPLAINT:     Evaluation and management for right kidney cancer                             REQUESTING PHYSICIAN: No ref. provider found  RECORDS OBTAINED:  Records of the patients history including those from the electronic medical record were reviewed and summarized in detail.    HISTORY OF PRESENT ILLNESS:    The patient is a 49 y.o. year old male with medical history significant for GERD, Padilla's esophagus and recurrent kidney stones was noted to have a right kidney lesion (2.1 cm solid and relatively hypervascular mass arising from the lateral parenchyma of the interpolar right kidney) while on a routine imaging for kidney stone evaluation.    Patient was seen by Dr. Bryan Palomares, urology who performed right laparoscopic partial nephrectomy on 7/30/2021.  Pathology: Chromophobe RCC.  2.7 cm.  Grade 2.  Margins negative. pT3a    Patient was kept on active surveillance.    A MRI abdomen from 1/14/2024 revealed a right anterior mid zone renal mass with enhancement measuring 1.8 x 1.3 cm concerning for renal cell carcinoma.    Patient underwent right laparoscopic partial nephrectomy on 3/27/2024.  Pathology: Clear-cell renal cell carcinoma.  1.5 cm.  Grade 3.  All margins negative.  No rhabdoid/sarcomatoid features. pT1a    Patient has been referred to this clinic for further evaluation and management.  No history of tobacco/alcohol/drug abuse.  Family history significant for father with history of lung and brain cancer, was a heavy smoker.    Plan made for continued active surveillance    INTERIM HISTORY:  Patient returns to the clinic for a follow-up visit.  Denies any new complaints since last visit.  He does report of having mild allergies/cough/respiratory symptoms.  Wearing a mask.  States he recently had COVID and flu vaccines and have been around sick people.  Denies any urinary symptoms.    Past Medical History:    Diagnosis Date    Anxiety     Padilla esophagus     Cancer 07/30/2021    RIGHT KIDNEY CANCER    Cholelithiasis     Depression     GERD (gastroesophageal reflux disease)     History of kidney stones     Right kidney mass      Past Surgical History:   Procedure Laterality Date    COLONOSCOPY N/A 03/22/2023    Procedure: COLONOSCOPY INTO CECUM/ TERMINAL ILEUM WITH POLYPECTOMY X 6, SALINE LIFT X 1, CLIPS X 2;  Surgeon: Anitra Hayes MD;  Location:  [facility];  Service: Gastroenterology;  Laterality: N/A;  pre: screening  post: hemorrhoids, polyps x 6    ENDOSCOPY N/A 04/06/2018    Z-line irregular, 39 cm from the incisors, LA Grade A esophagitis, esophageal mucosal changes suggestive of eosinophilic esophagitis. Dilated, gastritis, duodenitis, 1 cm hiatal hernia    ENDOSCOPY N/A 03/22/2023    Procedure: ESOPHAGOGASTRODUODENOSCOPY WITH BIOPSIES;  Surgeon: Anitra Hayes MD;  Location:  [facility];  Service: Gastroenterology;  Laterality: N/A;  pre: GERD, hx EOE  post: irregular Zline, gastritis, EOE    EXTRACORPOREAL SHOCKWAVE LITHOTRIPSY (ESWL), STENT INSERTION/REMOVAL Right 2021    KNEE ACL RECONSTRUCTION Right 2010    NEPHRECTOMY PARTIAL Right 07/30/2021    Procedure: RIGHT  LAPAROSCOPIC  PARTIAL NEPHRECTOMY;  Surgeon: Frandy Palomares MD;  Location: [facility];  Service: Urology;  Laterality: Right;    NEPHRECTOMY PARTIAL Right 3/27/2024    Procedure: LAPAROSCOPIC RIGHT PARTIAL NEPHRECTOMY;  Surgeon: Frandy Palomares MD;  Location: [facility] MAIN OR;  Service: Urology;  Laterality: Right;       MEDICATIONS    Current Outpatient Medications:     citalopram (CeleXA) 10 MG tablet, Take 1 tablet by mouth Daily., Disp: 90 tablet, Rfl: 2    esomeprazole (nexIUM) 40 MG capsule, Take 1 capsule by mouth Daily., Disp: 90 capsule, Rfl: 3    ALLERGIES:     Allergies   Allergen Reactions    Iodinated Contrast Media Anaphylaxis       SOCIAL HISTORY:       Social History     Socioeconomic History     "Marital status: Single     Spouse name: Miriam    Number of children: 3   Tobacco Use    Smoking status: Never    Smokeless tobacco: Never   Vaping Use    Vaping status: Never Used   Substance and Sexual Activity    Alcohol use: Yes     Comment: Maybe have a beer or bourbon five six times a year    Drug use: No    Sexual activity: Yes     Partners: Female     Birth control/protection: Vasectomy         FAMILY HISTORY:  Family History   Problem Relation Age of Onset    Lung cancer Father 69    Alcohol abuse Father     Hypertension Mother     Diabetes Paternal Grandmother     Malig Hyperthermia Neg Hx        REVIEW OF SYSTEMS:  As per HPI         Vitals:    10/28/24 1249   BP: 134/85   Pulse: 73   Temp: 97.9 °F (36.6 °C)   TempSrc: Oral   SpO2: 96%   Weight: 128 kg (282 lb 1.6 oz)   Height: 185.4 cm (73\")   PainSc: 0-No pain         10/28/2024    12:50 PM   Current Status   ECOG score 0      PHYSICAL EXAM:    CONSTITUTIONAL:  Vital signs reviewed.  No distress, looks comfortable.  EYES:  Conjunctiva and lids unremarkable.   EARS,NOSE,MOUTH,THROAT:  Ears and nose appear unremarkable.  Lips, teeth, gums appear unremarkable.  RESPIRATORY:  Normal respiratory effort.  Lungs clear to auscultation bilaterally.  CARDIOVASCULAR:  Normal S1, S2.  No murmurs rubs or gallops.  No significant lower extremity edema.  GASTROINTESTINAL: Abdomen appears unremarkable.  Not distended  LYMPHATIC:  No cervical, supraclavicular lymphadenopathy.  NEURO: AAOx3, no focal deficits.  Appears to have equal strength all 4 extremities.  MUSCULOSKELETAL:  Unremarkable digits/nails.  No cyanosis or clubbing.  No apparent joint deformities.  SKIN:  Warm.  No rashes.  PSYCHIATRIC:  Normal judgment and insight.  Normal mood and affect.     RECENT LABS:        Lab on 10/28/2024   Component Date Value Ref Range Status    Glucose 10/28/2024 103 (H)  65 - 99 mg/dL Final    BUN 10/28/2024 12  6 - 20 mg/dL Final    Creatinine 10/28/2024 0.87  0.76 - 1.27 " mg/dL Final    Sodium 10/28/2024 141  136 - 145 mmol/L Final    Potassium 10/28/2024 4.4  3.5 - 5.2 mmol/L Final    Chloride 10/28/2024 104  98 - 107 mmol/L Final    CO2 10/28/2024 26.6  22.0 - 29.0 mmol/L Final    Calcium 10/28/2024 9.2  8.6 - 10.5 mg/dL Final    Total Protein 10/28/2024 7.4  6.0 - 8.5 g/dL Final    Albumin 10/28/2024 4.1  3.5 - 5.2 g/dL Final    ALT (SGPT) 10/28/2024 25  1 - 41 U/L Final    AST (SGOT) 10/28/2024 26  1 - 40 U/L Final    Alkaline Phosphatase 10/28/2024 84  39 - 117 U/L Final    Total Bilirubin 10/28/2024 0.5  0.0 - 1.2 mg/dL Final    Globulin 10/28/2024 3.3  gm/dL Final    A/G Ratio 10/28/2024 1.2  g/dL Final    BUN/Creatinine Ratio 10/28/2024 13.8  7.0 - 25.0 Final    Anion Gap 10/28/2024 10.4  5.0 - 15.0 mmol/L Final    eGFR 10/28/2024 106.4  >60.0 mL/min/1.73 Final    WBC 10/28/2024 7.67  3.40 - 10.80 10*3/mm3 Final    RBC 10/28/2024 5.71  4.14 - 5.80 10*6/mm3 Final    Hemoglobin 10/28/2024 15.7  13.0 - 17.7 g/dL Final    Hematocrit 10/28/2024 48.3  37.5 - 51.0 % Final    MCV 10/28/2024 84.6  79.0 - 97.0 fL Final    MCH 10/28/2024 27.5  26.6 - 33.0 pg Final    MCHC 10/28/2024 32.5  31.5 - 35.7 g/dL Final    RDW 10/28/2024 13.5  12.3 - 15.4 % Final    RDW-SD 10/28/2024 41.6  37.0 - 54.0 fl Final    MPV 10/28/2024 10.3  6.0 - 12.0 fL Final    Platelets 10/28/2024 194  140 - 450 10*3/mm3 Final    Neutrophil % 10/28/2024 58.2  42.7 - 76.0 % Final    Lymphocyte % 10/28/2024 29.1  19.6 - 45.3 % Final    Monocyte % 10/28/2024 7.8  5.0 - 12.0 % Final    Eosinophil % 10/28/2024 4.0  0.3 - 6.2 % Final    Basophil % 10/28/2024 0.5  0.0 - 1.5 % Final    Immature Grans % 10/28/2024 0.4  0.0 - 0.5 % Final    Neutrophils, Absolute 10/28/2024 4.46  1.70 - 7.00 10*3/mm3 Final    Lymphocytes, Absolute 10/28/2024 2.23  0.70 - 3.10 10*3/mm3 Final    Monocytes, Absolute 10/28/2024 0.60  0.10 - 0.90 10*3/mm3 Final    Eosinophils, Absolute 10/28/2024 0.31  0.00 - 0.40 10*3/mm3 Final    Basophils,  Absolute 10/28/2024 0.04  0.00 - 0.20 10*3/mm3 Final    Immature Grans, Absolute 10/28/2024 0.03  0.00 - 0.05 10*3/mm3 Final    nRBC 10/28/2024 0.0  0.0 - 0.2 /100 WBC Final         ASSESSMENT:  Patient is a pleasant 49-year-old male with medical history significant for GERD, Padilla's esophagus and recurrent kidney stones who comes for right kidney cancer evaluation and management.      # Right kidney cancers, chromophobe s/p resection (July 2021) and clear-cell type (March 2024):   Patient was noted to have a right kidney lesion (2.1 cm solid and relatively hypervascular mass arising from the lateral parenchyma of the interpolar right kidney) while on a routine imaging for kidney stone evaluation.    Patient was seen by Dr. Bryan Palomares, urology who performed right laparoscopic partial nephrectomy on 7/30/2021.  Pathology: Chromophobe RCC.  2.7 cm.  Grade 2.  Margins negative. pT3a  Patient was kept on active surveillance.   A MRI abdomen from 1/14/2024 revealed a right anterior mid zone renal mass with enhancement measuring 1.8 x 1.3 cm concerning for renal cell carcinoma.   Patient underwent right laparoscopic partial nephrectomy on 3/27/2024. Pathology: Clear-cell renal cell carcinoma.  1.5 cm.  Grade 3.  All margins negative.  No rhabdoid/sarcomatoid features. pT1a  I reviewed the imaging and pathologic findings with patient and his wife. I reviewed the data from the keynote 564 trial which showed improvement in the median overall survival with 38% reduction in the risk of death compared with placebo with at event pembrolizumab. At a median follow-up of 57.2 months, OS in the pembrolizumab arm was 91.2% versus 86.0% in the placebo arm (HR 0.62, 95% CI [0.44, 0.87]; P = .002).  The benefit was mainly seen in intermediate high and high risk of recurrence ccRCC patients.  There has been other multiple adjuvant studies with TKI's and immune checkpoint inhibitors-all of which have shown no significant clinical  benefit.  In this patient with pT1a and grade 3 ccRCC, the benefit with adjuvant pembrolizumab in reducing risk of recurrence and improving survival is unclear.  These patients were not included in the keynote 564 clinical trial.  Discussed plan for continued active surveillance.    Surveillance CT scans performed [facility] October 2024 [facility] showed no evidence of disease recurrence.  10/28/2024: Patient remains asymptomatic.  No evidence of disease recurrence on recent CT scans.  Patient is scheduled for another scan in 6 months time.  Advised to maintain close follow-up with urology.  I will plan to see him back in this clinic on as-needed basis.    # Encounter for genetic screening  Given his relatively young age for developing 2 different types of kidney cancer, will check [facility] germline testing  10/28/2024: Reviewed [facility] test results which were negative for any germline mutations.  Patient expressed frustration about high cost (had to pay more than $800) for the [facility] test.    # Rhinitis and wheezing: Likely allergic/viral illness.  Monitor    PLAN:   -No benefit with adjuvant pembrolizumab  -Continue active surveillance  -Advised close follow-up with Dr. Palomares for surveillance scans  -Follow-up as needed    No orders of the defined types were placed in this encounter.  Total time spent during this patient encounter is 42 minutes. The total time spent with the patient includes: preparing to see the patient by reviewing of tests, prior notes or other relevant information, performing appropriate independent examination & evaluation, counseling, ordering of medications, tests or procedures, documenting clinic information in the electronic medical records or other health records, independently interpreting results of tests and communicating the results to the patient/family or caregiver.

## 2024-12-23 RX ORDER — CITALOPRAM HYDROBROMIDE 10 MG/1
10 TABLET ORAL DAILY
Qty: 90 TABLET | Refills: 2 | Status: SHIPPED | OUTPATIENT
Start: 2024-12-23

## 2025-02-25 RX ORDER — ALBUTEROL SULFATE 90 UG/1
2 INHALANT RESPIRATORY (INHALATION) EVERY 4 HOURS PRN
Qty: 6.7 G | Refills: 0 | Status: CANCELLED | OUTPATIENT
Start: 2025-02-25 | End: 2025-03-27

## 2025-02-28 ENCOUNTER — OFFICE VISIT (OUTPATIENT)
Dept: INTERNAL MEDICINE | Facility: CLINIC | Age: 50
End: 2025-02-28
Payer: COMMERCIAL

## 2025-02-28 VITALS
OXYGEN SATURATION: 95 % | SYSTOLIC BLOOD PRESSURE: 124 MMHG | HEIGHT: 73 IN | HEART RATE: 68 BPM | DIASTOLIC BLOOD PRESSURE: 94 MMHG | WEIGHT: 286.9 LBS | TEMPERATURE: 98 F | BODY MASS INDEX: 38.02 KG/M2

## 2025-02-28 DIAGNOSIS — Z00.00 HEALTH CARE MAINTENANCE: Primary | ICD-10-CM

## 2025-02-28 DIAGNOSIS — R73.9 HYPERGLYCEMIA: ICD-10-CM

## 2025-02-28 DIAGNOSIS — Z82.49 FAMILY HISTORY OF CARDIOVASCULAR DISEASE: ICD-10-CM

## 2025-02-28 DIAGNOSIS — E66.9 OBESITY (BMI 30-39.9): Chronic | ICD-10-CM

## 2025-02-28 DIAGNOSIS — G47.33 OBSTRUCTIVE SLEEP APNEA SYNDROME: ICD-10-CM

## 2025-02-28 RX ORDER — ALBUTEROL SULFATE 90 UG/1
2 INHALANT RESPIRATORY (INHALATION) EVERY 4 HOURS PRN
Qty: 6.7 G | Refills: 0 | Status: CANCELLED | OUTPATIENT
Start: 2025-02-25 | End: 2025-03-27

## 2025-02-28 NOTE — PROGRESS NOTES
Subjective   Lester Benoit is a 49 y.o. male.     Chief Complaint   Patient presents with    Annual Exam    sleep study         History of Present Illness   Lester Benoit 49 y.o. male who presents for an Annual Wellness Visit.  he has a history of   Patient Active Problem List   Diagnosis    Gastroesophageal reflux disease    Seasonal allergic rhinitis    Obesity (BMI 30-39.9)    Dysphagia    Eyelid lesion    Hyperglycemia    Calculus of gallbladder without biliary obstruction    Asymptomatic microscopic hematuria    Pharyngoesophageal dysphagia    Esophagitis, eosinophilic    Dysthymia    Primary insomnia    Renal mass, right    Renal carcinoma, right    Colon cancer screening    Health care maintenance    Right kidney mass    Acute left-sided low back pain without sciatica    Family history of cardiovascular disease    Obstructive sleep apnea syndrome   .  he has been feeling well.   I  reviewed health maintenance with him as part of my preventative care plan.  Has regular dental and eye exams  The following portions of the patient's history were reviewed and updated as appropriate: allergies, current medications, past family history, past medical history, past social history, past surgical history, and problem list.    Review of Systems   Constitutional:  Positive for fatigue. Negative for chills, diaphoresis and fever.   HENT:  Positive for congestion. Negative for sore throat.    Respiratory:  Negative for cough.    Cardiovascular:  Negative for chest pain.   Gastrointestinal:  Negative for abdominal pain, nausea and vomiting.   Genitourinary:  Negative for dysuria and urgency.   Musculoskeletal:  Negative for myalgias and neck pain.   Skin:  Negative for rash.   Neurological:  Negative for weakness, numbness and headaches.   Psychiatric/Behavioral:  Positive for sleep disturbance.        Objective   Physical Exam  Vitals and nursing note reviewed.   Constitutional:       Appearance: Normal appearance. He is  well-developed. He is not diaphoretic.   HENT:      Head: Normocephalic and atraumatic.      Right Ear: Tympanic membrane, ear canal and external ear normal.      Left Ear: Tympanic membrane, ear canal and external ear normal.      Mouth/Throat:      Comments: Narrowed pharynx  Eyes:      General: Lids are normal. No scleral icterus.     Extraocular Movements: Extraocular movements intact.      Conjunctiva/sclera: Conjunctivae normal.   Neck:      Thyroid: No thyroid mass or thyromegaly.      Vascular: No carotid bruit or JVD.   Cardiovascular:      Rate and Rhythm: Normal rate and regular rhythm.      Pulses: Normal pulses.           Radial pulses are 2+ on the right side and 2+ on the left side.      Heart sounds: Normal heart sounds. No murmur heard.  Pulmonary:      Effort: Pulmonary effort is normal. No respiratory distress.      Breath sounds: Normal breath sounds.   Abdominal:      Palpations: Abdomen is soft.      Tenderness: There is no right CVA tenderness or left CVA tenderness.   Musculoskeletal:      Cervical back: Normal range of motion.      Right lower leg: No edema.      Left lower leg: No edema.   Skin:     General: Skin is warm and dry.      Coloration: Skin is not pale.      Findings: No erythema or rash.   Neurological:      General: No focal deficit present.      Mental Status: He is alert and oriented to person, place, and time.      Sensory: No sensory deficit.      Deep Tendon Reflexes: Reflexes are normal and symmetric.   Psychiatric:         Mood and Affect: Mood normal.         Behavior: Behavior normal. Behavior is cooperative.         Thought Content: Thought content normal.         Judgment: Judgment normal.         Assessment & Plan   Diagnoses and all orders for this visit:    1. Health care maintenance (Primary)  -     Lipid Panel  -     Hemoglobin A1c  -     Comprehensive Metabolic Panel  -     TSH    2. Hyperglycemia  -     Comprehensive Metabolic Panel  -     Semaglutide-Weight  Management 0.25 MG/0.5ML solution auto-injector; Inject 0.5 mL under the skin into the appropriate area as directed 1 (One) Time Per Week.  Dispense: 2 mL; Refill: 1    3. Family history of cardiovascular disease  -     Semaglutide-Weight Management 0.25 MG/0.5ML solution auto-injector; Inject 0.5 mL under the skin into the appropriate area as directed 1 (One) Time Per Week.  Dispense: 2 mL; Refill: 1    4. Obesity (BMI 30-39.9)  -     Semaglutide-Weight Management 0.25 MG/0.5ML solution auto-injector; Inject 0.5 mL under the skin into the appropriate area as directed 1 (One) Time Per Week.  Dispense: 2 mL; Refill: 1    5. Obstructive sleep apnea syndrome  -     Ambulatory Referral to Sleep Medicine      Continue attempts at healthy lifestyle calorie appropriate diet and regular physical activity  Education provided regarding prevention of serious illness with immunizations patient's current  Education provided regarding early detection and screening regarding colorectal cancer is current no prostate symptoms  Follow-up ongoing management of chronic problems otherwise preventively annually

## 2025-03-01 LAB
ALBUMIN SERPL-MCNC: 4.1 G/DL (ref 3.5–5.2)
ALBUMIN/GLOB SERPL: 1.6 G/DL
ALP SERPL-CCNC: 89 U/L (ref 39–117)
ALT SERPL-CCNC: 34 U/L (ref 1–41)
AST SERPL-CCNC: 23 U/L (ref 1–40)
BILIRUB SERPL-MCNC: 0.3 MG/DL (ref 0–1.2)
BUN SERPL-MCNC: 14 MG/DL (ref 6–20)
BUN/CREAT SERPL: 16.9 (ref 7–25)
CALCIUM SERPL-MCNC: 9.2 MG/DL (ref 8.6–10.5)
CHLORIDE SERPL-SCNC: 104 MMOL/L (ref 98–107)
CHOLEST SERPL-MCNC: 225 MG/DL (ref 0–200)
CO2 SERPL-SCNC: 26 MMOL/L (ref 22–29)
CREAT SERPL-MCNC: 0.83 MG/DL (ref 0.76–1.27)
EGFRCR SERPLBLD CKD-EPI 2021: 108 ML/MIN/1.73
GLOBULIN SER CALC-MCNC: 2.6 GM/DL
GLUCOSE SERPL-MCNC: 122 MG/DL (ref 65–99)
HBA1C MFR BLD: 6.9 % (ref 4.8–5.6)
HDLC SERPL-MCNC: 40 MG/DL (ref 40–60)
LDLC SERPL CALC-MCNC: 147 MG/DL (ref 0–100)
POTASSIUM SERPL-SCNC: 4.2 MMOL/L (ref 3.5–5.2)
PROT SERPL-MCNC: 6.7 G/DL (ref 6–8.5)
SODIUM SERPL-SCNC: 140 MMOL/L (ref 136–145)
TRIGL SERPL-MCNC: 210 MG/DL (ref 0–150)
TSH SERPL DL<=0.005 MIU/L-ACNC: 1.25 UIU/ML (ref 0.27–4.2)
VLDLC SERPL CALC-MCNC: 38 MG/DL (ref 5–40)

## 2025-03-03 RX ORDER — ALBUTEROL SULFATE 90 UG/1
2 INHALANT RESPIRATORY (INHALATION) EVERY 4 HOURS PRN
Qty: 6.7 G | Refills: 0 | Status: CANCELLED | OUTPATIENT
Start: 2025-03-03 | End: 2025-04-02

## 2025-03-04 ENCOUNTER — OFFICE VISIT (OUTPATIENT)
Dept: SLEEP MEDICINE | Facility: HOSPITAL | Age: 50
End: 2025-03-04
Payer: COMMERCIAL

## 2025-03-04 VITALS — HEART RATE: 87 BPM | HEIGHT: 73 IN | OXYGEN SATURATION: 96 % | BODY MASS INDEX: 38.17 KG/M2 | WEIGHT: 288 LBS

## 2025-03-04 DIAGNOSIS — E66.01 CLASS 2 SEVERE OBESITY DUE TO EXCESS CALORIES WITH SERIOUS COMORBIDITY AND BODY MASS INDEX (BMI) OF 38.0 TO 38.9 IN ADULT: ICD-10-CM

## 2025-03-04 DIAGNOSIS — G47.10 HYPERSOMNIA WITH SLEEP APNEA: Primary | ICD-10-CM

## 2025-03-04 DIAGNOSIS — G47.30 HYPERSOMNIA WITH SLEEP APNEA: Primary | ICD-10-CM

## 2025-03-04 DIAGNOSIS — E66.812 CLASS 2 SEVERE OBESITY DUE TO EXCESS CALORIES WITH SERIOUS COMORBIDITY AND BODY MASS INDEX (BMI) OF 38.0 TO 38.9 IN ADULT: ICD-10-CM

## 2025-03-04 PROCEDURE — 99204 OFFICE O/P NEW MOD 45 MIN: CPT | Performed by: INTERNAL MEDICINE

## 2025-03-04 PROCEDURE — G0463 HOSPITAL OUTPT CLINIC VISIT: HCPCS

## 2025-03-04 NOTE — PROGRESS NOTES
[facility]  Sleep Disorders Center New Patient/Consultation       Reason for Consultation: TITI    Patient Care Team:  Wali Khan MD as PCP - General (Family Medicine)  Frandy Palomares MD as Referring Physician (Urology)  Hudson Johnson MD as Consulting Physician (Hematology and Oncology)  Daniel Jaquez MD as Consulting Physician (Sleep Medicine)    Chief complaint: Snoring and obstructive apnea symptoms    History of present illness:    Thank you for asking me to see your patient.  The patient is a 50 y.o. male who states he is always snored.  He states he only sleeps 6 hours and he wakes up a lot during that time.  He never feels refreshed.  He goes to bed at midnight gets out of bed at 7:30 AM.  It might take him 20 minutes to up to 2 hours to fall asleep.  He is tired upon arising.  He denies taking naps.  He has complaints of fatigue.  [place] Sleepiness Scale is normal at 6.  He states his weight has been up and down.  He has awaken with a snort.  He has also awaken coughing.  He has morning headaches.  He has a dry mouth in the morning.  He grinds his teeth and he sweats excessively during.  He has difficulty staying asleep with frequent awakenings.  He will use the restroom once or twice in the nighttime.    The patient reports having 2 different renal cancers.  The patient did undergo right laparoscopic partial nephrectomy on 7/30/2021. Pathology: Chromophobe RCC. 2.7 cm. Grade 2. Margins negative. pT3a.  Subsequently, another right laparoscopic partial nephrectomy on 3/27/2024.  Pathology: Clear-cell renal cell carcinoma.  1.5 cm.  Grade 3.  All margins negative.  No rhabdoid/sarcomatoid features. pT1a    Review of Systems:    A complete review of systems was done and all were negative with the exception of nasal congestion, neck pain, depression and frequent heartburn and he reports a history of Padilla's esophagus.    History:  Past Medical History:   Diagnosis Date     Anxiety     Padilla esophagus     Cancer 07/30/2021    RIGHT KIDNEY CANCER    Cholelithiasis     Depression     GERD (gastroesophageal reflux disease)     History of kidney stones     Obstructive sleep apnea syndrome 2/28/2025    Right kidney mass    ,   Past Surgical History:   Procedure Laterality Date    COLONOSCOPY N/A 03/22/2023    Procedure: COLONOSCOPY INTO CECUM/ TERMINAL ILEUM WITH POLYPECTOMY X 6, SALINE LIFT X 1, CLIPS X 2;  Surgeon: Anitra Hayes MD;  Location:  [facility];  Service: Gastroenterology;  Laterality: N/A;  pre: screening  post: hemorrhoids, polyps x 6    ENDOSCOPY N/A 04/06/2018    Z-line irregular, 39 cm from the incisors, LA Grade A esophagitis, esophageal mucosal changes suggestive of eosinophilic esophagitis. Dilated, gastritis, duodenitis, 1 cm hiatal hernia    ENDOSCOPY N/A 03/22/2023    Procedure: ESOPHAGOGASTRODUODENOSCOPY WITH BIOPSIES;  Surgeon: Anitra Hayes MD;  Location:  [facility];  Service: Gastroenterology;  Laterality: N/A;  pre: GERD, hx EOE  post: irregular Zline, gastritis, EOE    EXTRACORPOREAL SHOCKWAVE LITHOTRIPSY (ESWL), STENT INSERTION/REMOVAL Right 2021    KNEE ACL RECONSTRUCTION Right 2010    NEPHRECTOMY PARTIAL Right 07/30/2021    Procedure: RIGHT  LAPAROSCOPIC  PARTIAL NEPHRECTOMY;  Surgeon: Frandy Palomares MD;  Location: [facility] MAIN OR;  Service: Urology;  Laterality: Right;    NEPHRECTOMY PARTIAL Right 3/27/2024    Procedure: LAPAROSCOPIC RIGHT PARTIAL NEPHRECTOMY;  Surgeon: Frandy Palomares MD;  Location: [facility] MAIN OR;  Service: Urology;  Laterality: Right;   ,   Family History   Problem Relation Age of Onset    Lung cancer Father 69    Alcohol abuse Father     Hypertension Mother     Diabetes Paternal Grandmother     Malig Hyperthermia Neg Hx    , and   Social History     Socioeconomic History    Marital status: Single     Spouse name: Miriam    Number of children: 3   Tobacco Use    Smoking status: Never    Smokeless tobacco:  "Never   Vaping Use    Vaping status: Never Used   Substance and Sexual Activity    Alcohol use: Not Currently     Comment: Maybe have a beer or bourbon five six times a year    Drug use: No    Sexual activity: Yes     Partners: Female     Birth control/protection: Vasectomy     E-cigarette/Vaping    E-cigarette/Vaping Use Never User      E-cigarette/Vaping Substances    Nicotine No     THC No     CBD No     Flavoring No      E-cigarette/Vaping Devices    Disposable No     Pre-filled or Refillable Cartridge No     Refillable Tank No     Pre-filled Pod No       Social History: He works [employer].  1-3 caffeinated beverages a day.    Allergies:  Iodinated contrast media     Medication Review: Escitalopram and esomeprazole    Vital Signs:    Vitals:    03/04/25 0843   Pulse: 87   SpO2: 96%   Weight: 131 kg (288 lb)   Height: 185.4 cm (73\")      Body mass index is 38 kg/m².  Neck Circumference: 18 inches      Physical Exam:    Constitutional:  Well developed 50 y.o. male that appears in no apparent distress.  Awake & oriented times 3.  Normal mood with normal recent and remote memory and normal judgement.  Eyes:  Conjunctivae normal.  Oropharynx: Moist mucous membranes without exudate and a large tongue and large base of his uvula and class III Mallampati airway.    Neck: Trachea midline  Respiratory: Effort is not labored  Cardiovascular: Radial pulse regular  Musculoskeletal: Gait appears normal, no digital clubbing evident, no pre-tibial edema    Impression:   The patient has complaints of snoring and other symptoms suggestive of sleep disordered breathing, rule out obstructive sleep apnea.  The patient does have complaints of hypersomnolence with a normal [place] Sleepiness Scale.    STOP-BANG score is 5 out of 8 suggesting high risk of having obstructive sleep apnea.    Plan:  Good sleep hygiene measures should be maintained.  Weight loss would be beneficial in this patient who has class II severe obesity by Body " mass index is 38 kg/m².    Pathophysiology of TITI described to the patient.  Cardiovascular complications of untreated TITI also reviewed.      After reviewing all with the patient, I would recommend and he is agreeable to proceed with a home sleep study.  I described the procedure to him and I answered all of his questions.  Based on his high risk of having obstructive sleep apnea, also described CPAP therapy to him.  Initially, nasal interface should be selected but he may need a fullface mask?  Again, I answered all of his questions related to therapy.  Home sleep study will be scheduled and further recommendations will be made once those results are known.    Thank you for requesting me to assist in this patient's care.    Daniel Jaquez MD  Sleep Medicine  03/04/25  08:46 EST

## 2025-03-10 ENCOUNTER — TELEPHONE (OUTPATIENT)
Dept: INTERNAL MEDICINE | Facility: CLINIC | Age: 50
End: 2025-03-10
Payer: COMMERCIAL

## 2025-03-10 DIAGNOSIS — R73.9 HYPERGLYCEMIA: ICD-10-CM

## 2025-03-10 DIAGNOSIS — E66.9 OBESITY (BMI 30-39.9): Chronic | ICD-10-CM

## 2025-03-10 DIAGNOSIS — Z82.49 FAMILY HISTORY OF CARDIOVASCULAR DISEASE: ICD-10-CM

## 2025-03-10 NOTE — TELEPHONE ENCOUNTER
Caller: Lester Benoit    Relationship to patient: Self      Best call back number: [phone]     Provider: ZORA HIGGINS PA needed: Semaglutide-Weight Management 0.25 MG/0.5ML solution auto-injector     Reason for call/Prior Auth:

## 2025-03-11 ENCOUNTER — HOSPITAL ENCOUNTER (OUTPATIENT)
Dept: SLEEP MEDICINE | Facility: HOSPITAL | Age: 50
Discharge: HOME | End: 2025-03-11
Admitting: INTERNAL MEDICINE
Payer: COMMERCIAL

## 2025-03-11 DIAGNOSIS — G47.30 HYPERSOMNIA WITH SLEEP APNEA: ICD-10-CM

## 2025-03-11 DIAGNOSIS — G47.10 HYPERSOMNIA WITH SLEEP APNEA: ICD-10-CM

## 2025-03-11 PROCEDURE — G0399 HOME SLEEP TEST/TYPE 3 PORTA: HCPCS

## 2025-03-11 NOTE — TELEPHONE ENCOUNTER
Prior authorization has been done today and will be faxed over soon as I obtain a signature from Dr. Khan.

## 2025-03-12 DIAGNOSIS — R73.9 HYPERGLYCEMIA: Primary | ICD-10-CM

## 2025-03-14 ENCOUNTER — TELEPHONE (OUTPATIENT)
Dept: SLEEP MEDICINE | Facility: HOSPITAL | Age: 50
End: 2025-03-14
Payer: COMMERCIAL

## 2025-03-20 PROBLEM — E11.65 TYPE 2 DIABETES MELLITUS WITH HYPERGLYCEMIA: Status: ACTIVE | Noted: 2025-03-20

## 2025-04-08 ENCOUNTER — OFFICE VISIT (OUTPATIENT)
Dept: INTERNAL MEDICINE | Facility: CLINIC | Age: 50
End: 2025-04-08
Payer: COMMERCIAL

## 2025-04-08 VITALS
DIASTOLIC BLOOD PRESSURE: 76 MMHG | HEART RATE: 68 BPM | OXYGEN SATURATION: 98 % | WEIGHT: 282 LBS | TEMPERATURE: 97.7 F | BODY MASS INDEX: 37.37 KG/M2 | SYSTOLIC BLOOD PRESSURE: 110 MMHG | HEIGHT: 73 IN

## 2025-04-08 DIAGNOSIS — E11.65 TYPE 2 DIABETES MELLITUS WITH HYPERGLYCEMIA, WITHOUT LONG-TERM CURRENT USE OF INSULIN: Primary | ICD-10-CM

## 2025-04-08 PROCEDURE — 99213 OFFICE O/P EST LOW 20 MIN: CPT | Performed by: FAMILY MEDICINE

## 2025-04-08 RX ORDER — SEMAGLUTIDE 0.68 MG/ML
0.25 INJECTION, SOLUTION SUBCUTANEOUS WEEKLY
Qty: 3 ML | Refills: 1 | Status: SHIPPED | OUTPATIENT
Start: 2025-04-08 | End: 2025-04-10 | Stop reason: SDUPTHER

## 2025-04-08 NOTE — PROGRESS NOTES
"Chief Complaint  Diabetes    Subjective        Lester Benoit presents to [facility]  History of Present Illness  Patient appointment to discuss treatment for diabetes [facility] with elevated A1c 6.9  No rashes no polyuria no polydipsia was unable to obtain semaglutide previously has not taken any medication although he is changed his diet and is pending consultation with diabetic educator  Objective   Vital Signs:  /76   Pulse 68   Temp 97.7 °F (36.5 °C)   Ht 185.4 cm (73\")   Wt 128 kg (282 lb)   SpO2 98%   BMI 37.21 kg/m²   Estimated body mass index is 37.21 kg/m² as calculated from the following:    Height as of this encounter: 185.4 cm (73\").    Weight as of this encounter: 128 kg (282 lb).            Physical Exam  Vitals and nursing note reviewed.   Constitutional:       Appearance: He is obese.   Cardiovascular:      Rate and Rhythm: Normal rate.   Pulmonary:      Effort: Pulmonary effort is normal.   Neurological:      Mental Status: He is alert.   Psychiatric:         Mood and Affect: Mood normal.         Behavior: Behavior normal.         Thought Content: Thought content normal.         Judgment: Judgment normal.        Result Review :    Common labs          4/30/2024    15:27 10/28/2024    12:25 2/28/2025    09:35   Common Labs   Glucose  103  122    BUN  12  14    Creatinine  0.87  0.83    Sodium  141  140    Potassium  4.4  4.2    Chloride  104  104    Calcium  9.2  9.2    Albumin  4.1  4.1    Total Bilirubin  0.5  0.3    Alkaline Phosphatase  84  89    AST (SGOT)  26  23    ALT (SGPT)  25  34    WBC 6.86  7.67     Hemoglobin 14.2  15.7     Hematocrit 43.5  48.3     Platelets 194  194     Total Cholesterol   225    Triglycerides   210    HDL Cholesterol   40    LDL Cholesterol    147    Hemoglobin A1C   6.90      Most Recent A1C          2/28/2025    09:35   HGBA1C Most Recent   Hemoglobin A1C 6.90                Assessment and Plan   Diagnoses and all orders " for this visit:    1. Type 2 diabetes mellitus with hyperglycemia, without long-term current use of insulin (Primary)  -     Semaglutide,0.25 or 0.5MG/DOS, (Ozempic, 0.25 or 0.5 MG/DOSE,) 2 MG/3ML solution pen-injector; Inject 0.25 mg under the skin into the appropriate area as directed 1 (One) Time Per Week. After 4 doses increase to 0.5 mg weekly  Dispense: 3 mL; Refill: 1    Providing continuous glucose monitor system short-term since patient is not taking insulin to help him understand sugar control and meals         Follow Up   Return in about 1 month (around 5/8/2025), or if symptoms worsen or fail to improve.  Patient was given instructions and counseling regarding his condition or for health maintenance advice. Please see specific information pulled into the AVS if appropriate.

## 2025-04-10 DIAGNOSIS — E11.65 TYPE 2 DIABETES MELLITUS WITH HYPERGLYCEMIA, WITHOUT LONG-TERM CURRENT USE OF INSULIN: ICD-10-CM

## 2025-04-10 NOTE — TELEPHONE ENCOUNTER
Caller: Lester Benoit    Relationship: Self    Best call back number: [phone]    Requested Prescriptions:   Requested Prescriptions     Pending Prescriptions Disp Refills    Semaglutide,0.25 or 0.5MG/DOS, (Ozempic, 0.25 or 0.5 MG/DOSE,) 2 MG/3ML solution pen-injector 3 mL 1     Sig: Inject 0.25 mg under the skin into the appropriate area as directed 1 (One) Time Per Week. After 4 doses increase to 0.5 mg weekly      Pharmacy where request should be sent: [facility]     Last office visit with prescribing clinician: 4/8/2025   Last telemedicine visit with prescribing clinician: Visit date not found   Next office visit with prescribing clinician: 6/9/2025     Additional details provided by patient: PATIENT STATES THAT [facility] HAS NOT LET HIM KNOW THAT THIS HAS BEEN CALLED IN. PLEASE ADVISE.    Does the patient have less than a 3 day supply:  [x] Yes  [] No      Bernadette Younger Rep   04/10/25 15:38 EDT

## 2025-04-14 ENCOUNTER — TELEPHONE (OUTPATIENT)
Dept: INTERNAL MEDICINE | Facility: CLINIC | Age: 50
End: 2025-04-14
Payer: COMMERCIAL

## 2025-04-14 RX ORDER — SEMAGLUTIDE 0.68 MG/ML
0.25 INJECTION, SOLUTION SUBCUTANEOUS WEEKLY
Qty: 3 ML | Refills: 1 | Status: SHIPPED | OUTPATIENT
Start: 2025-04-14

## 2025-04-14 NOTE — TELEPHONE ENCOUNTER
Caller: [facility]      Best call back number: [phone]  FAX: [phone]    Provider: DR. HIGGINS     Medication PA needed: Semaglutide,0.25 or 0.5MG/DOS, (Ozempic, 0.25 or 0.5 MG/DOSE,) 2 MG/3ML solution pen-injector    Reason for call/Prior Auth: INSURANCE REQUIREMENT     KEYCODE: [id number]  PT MUST BE TYPE 2 DIABETIC AND HAVE A1C OF 6.5 OR GREATER.

## 2025-04-15 ENCOUNTER — TELEPHONE (OUTPATIENT)
Dept: INTERNAL MEDICINE | Facility: CLINIC | Age: 50
End: 2025-04-15
Payer: COMMERCIAL

## 2025-04-15 NOTE — TELEPHONE ENCOUNTER
Hub staff attempted to follow warm transfer process and was unsuccessful     Caller: Lester Benoit    Relationship to patient: Self    Best call back number: [phone]     Patient is needing: PATIENT IS CALLING OLGA LIDIA BACK.  HE STATES HE HAS NOT TRIED THE MEDICATION THAT SHE ASKED HIM ABOUT.    UNABLE TO WARM TRANSFER.    PLEASE ADVISE.

## 2025-04-15 NOTE — TELEPHONE ENCOUNTER
PA has been done for this medication, stated it can take up to 5 business days for approval or denial.

## 2025-04-15 NOTE — TELEPHONE ENCOUNTER
Working on PA, called patient and left voicemail, wondering if patient has ever taken anything else for diabetes. If patient calls back HUB PLEASE WARM TRANSFER.

## 2025-04-16 ENCOUNTER — PRIOR AUTHORIZATION (OUTPATIENT)
Dept: INTERNAL MEDICINE | Facility: CLINIC | Age: 50
End: 2025-04-16
Payer: COMMERCIAL

## 2025-04-16 NOTE — TELEPHONE ENCOUNTER
Ozempic (2 MG/DOSE) 8MG/3ML pen-injectors PA done on cover my meds, waiting on response.      (Key: [id number])

## 2025-04-20 ENCOUNTER — DOCUMENTATION (OUTPATIENT)
Dept: SLEEP MEDICINE | Facility: HOSPITAL | Age: 50
End: 2025-04-20
Payer: COMMERCIAL

## 2025-04-20 NOTE — PROGRESS NOTES
Home sleep study performed 3/11/2025.  AHI/NEELAM for total monitoring time is moderately abnormal at 18.4 events per hour. Lowest oxygen saturation is 75% and sleep-related hypoxia present for 42.3 minutes.  Auto CPAP initiated.    Overnight oximetry performed 4/10/2025 with duration 7 hours and 50 minutes.  Low oxygen saturation 85% and no sleep-related hypoxia identified.  Auto CPAP adequately treated the patient's sleep-related hypoxia.    Follow-up to be arranged.

## 2025-04-22 ENCOUNTER — TELEPHONE (OUTPATIENT)
Dept: SLEEP MEDICINE | Facility: HOSPITAL | Age: 50
End: 2025-04-22
Payer: COMMERCIAL

## 2025-05-13 ENCOUNTER — OFFICE VISIT (OUTPATIENT)
Dept: SLEEP MEDICINE | Facility: HOSPITAL | Age: 50
End: 2025-05-13
Payer: COMMERCIAL

## 2025-05-13 VITALS — OXYGEN SATURATION: 97 % | BODY MASS INDEX: 35.39 KG/M2 | HEIGHT: 73 IN | WEIGHT: 267 LBS | HEART RATE: 71 BPM

## 2025-05-13 DIAGNOSIS — G47.36 HYPOXEMIA ASSOCIATED WITH SLEEP: ICD-10-CM

## 2025-05-13 DIAGNOSIS — E66.01 CLASS 2 SEVERE OBESITY DUE TO EXCESS CALORIES WITH SERIOUS COMORBIDITY AND BODY MASS INDEX (BMI) OF 35.0 TO 35.9 IN ADULT: Primary | ICD-10-CM

## 2025-05-13 DIAGNOSIS — E66.812 CLASS 2 SEVERE OBESITY DUE TO EXCESS CALORIES WITH SERIOUS COMORBIDITY AND BODY MASS INDEX (BMI) OF 35.0 TO 35.9 IN ADULT: Primary | ICD-10-CM

## 2025-05-13 DIAGNOSIS — G47.33 OBSTRUCTIVE SLEEP APNEA SYNDROME: ICD-10-CM

## 2025-05-13 PROCEDURE — 99213 OFFICE O/P EST LOW 20 MIN: CPT | Performed by: INTERNAL MEDICINE

## 2025-05-13 PROCEDURE — G0463 HOSPITAL OUTPT CLINIC VISIT: HCPCS

## 2025-05-13 NOTE — PROGRESS NOTES
"[facility]  Follow Up Sleep Disorders Center Note     Chief Complaint:  TITI     Primary Care Physician: Wali Khan MD    Interval History:   The patient is a 50 y.o. male who I last saw 3/4/2025 and that note was reviewed. Home sleep study performed 3/11/2025.  AHI/NEELAM for total monitoring time is moderately abnormal at 18.4 events per hour. Lowest oxygen saturation is 75% and sleep-related hypoxia present for 42.3 minutes.  Auto CPAP initiated.     Overnight oximetry performed 4/10/2025 with duration 7 hours and 50 minutes.  Low oxygen saturation 85% and no sleep-related hypoxia identified.  Auto CPAP adequately treated the patient's sleep-related hypoxia.    The patient is here for follow-up.  He states he is improved.  However, he complains of mask leak.    Review of Systems:    A complete review of systems was done and all were negative with the exception of the above    Social History:    Social History     Socioeconomic History    Marital status: Significant Other     Spouse name: Miriam    Number of children: 3   Tobacco Use    Smoking status: Never    Smokeless tobacco: Never   Vaping Use    Vaping status: Never Used   Substance and Sexual Activity    Alcohol use: Not Currently     Comment: Maybe have a beer or bourbon five six times a year    Drug use: No    Sexual activity: Yes     Partners: Female     Birth control/protection: Vasectomy       Allergies:  Iodinated contrast media     Medication Review: His list was reviewed.      Vital Signs:    Vitals:    05/13/25 1041   Pulse: 71   SpO2: 97%   Weight: 121 kg (267 lb)   Height: 185.4 cm (73\")     Body mass index is 35.23 kg/m².    Physical Exam:    Constitutional:  Well developed 50 y.o. male that appears in no apparent distress.  Awake & oriented times 3.  Normal mood with normal recent and remote memory and normal judgement.  Eyes:  Conjunctivae normal.  Oropharynx: Previously, moist mucous membranes without exudate and  a large " tongue and large base of his uvula and class III Mallampati airway     Self-administered [place] Sleepiness Scale test results: 5, previously 6  0-5 Lower normal daytime sleepiness  6-10 Higher normal daytime sleepiness  11-12 Mild, 13-15 Moderate, & 16-24 Severe excessive daytime sleepiness     Downloaded PAP Data Evaluated For Therapeutic Response and Compliance:  DME is [employer] and the patient uses a nasal pillow.  Downloads between 3/20 and 5/8/2025 compliance 100%.  Average usage is 7 hours and 22 minutes.  Average AHI is normal without leak.  Average auto CPAP pressure is 9.9 and his ResMed auto CPAP is 8-20    Impression:   Moderate severity obstructive sleep apnea with sleep-related hypoxia by home sleep study 3/11/2025, weight 288 pounds, adequately treated with ResMed auto CPAP.  Downloads demonstrate the patient to be at goal with good compliance and usage.  The patient has no significant complaints of hypersomnolence.    Plan:  Good sleep hygiene measures should be maintained.  Further weight loss would be beneficial in this patient who has class II severe obesity by Body mass index is 35.23 kg/m².  When last seen 3/4/2025, weight 288 pounds and today he weighs 267 pounds.     After evaluating the patient and assessing results available, the patient is benefiting from the treatment being provided.     The patient will continue ResMed auto CPAP.  Potential side effects of not using PAP therapy reviewed and addressed as needed.  After clinical evaluation and review of downloads, I recommend changes to the patient's pressures.  Based on downloads, auto CPAP will be changed to 7-12 cm water pressure.  A new prescription will be sent to the patient's DME.    I answered all of the patient's questions.  The patient will call the Sleep Disorder Center for any problems and will follow up in 8 months.      Daniel Jaquez MD  Sleep Medicine  05/13/25  10:57 EDT

## 2025-06-09 ENCOUNTER — OFFICE VISIT (OUTPATIENT)
Dept: INTERNAL MEDICINE | Facility: CLINIC | Age: 50
End: 2025-06-09
Payer: COMMERCIAL

## 2025-06-09 VITALS
HEIGHT: 73 IN | TEMPERATURE: 98.1 F | DIASTOLIC BLOOD PRESSURE: 82 MMHG | HEART RATE: 63 BPM | WEIGHT: 264 LBS | BODY MASS INDEX: 34.99 KG/M2 | RESPIRATION RATE: 16 BRPM | OXYGEN SATURATION: 99 % | SYSTOLIC BLOOD PRESSURE: 132 MMHG

## 2025-06-09 DIAGNOSIS — K21.00 GASTROESOPHAGEAL REFLUX DISEASE WITH ESOPHAGITIS WITHOUT HEMORRHAGE: Chronic | ICD-10-CM

## 2025-06-09 DIAGNOSIS — E78.2 MIXED HYPERLIPIDEMIA: ICD-10-CM

## 2025-06-09 DIAGNOSIS — E11.65 TYPE 2 DIABETES MELLITUS WITH HYPERGLYCEMIA, WITHOUT LONG-TERM CURRENT USE OF INSULIN: Primary | ICD-10-CM

## 2025-06-09 PROCEDURE — 99214 OFFICE O/P EST MOD 30 MIN: CPT | Performed by: FAMILY MEDICINE

## 2025-06-09 RX ORDER — SEMAGLUTIDE 0.68 MG/ML
0.5 INJECTION, SOLUTION SUBCUTANEOUS WEEKLY
Qty: 3 ML | Refills: 2 | Status: SHIPPED | OUTPATIENT
Start: 2025-06-09

## 2025-06-09 RX ORDER — ATORVASTATIN CALCIUM 20 MG/1
20 TABLET, FILM COATED ORAL DAILY
Qty: 90 TABLET | Refills: 2 | Status: SHIPPED | OUTPATIENT
Start: 2025-06-09

## 2025-06-09 RX ORDER — HYDROCHLOROTHIAZIDE 12.5 MG/1
1 CAPSULE ORAL
Qty: 2 EACH | Refills: 11 | Status: SHIPPED | OUTPATIENT
Start: 2025-06-09

## 2025-06-09 NOTE — PROGRESS NOTES
"Chief Complaint  Diabetes  Hyperlipidemia GERD  Subjective        Lester JONG Benoit presents to [facility]  History of Present Illness  Follow-up diabetes after his lamenting semaglutide therapy.  He has elevated cholesterol The 10-year ASCVD risk score (Derick CASIANO, et al., 2019) is: 9.4%    Values used to calculate the score:      Age: 50 years      Sex: Male      Is Non- : No      Diabetic: Yes      Tobacco smoker: No      Systolic Blood Pressure: 132 mmHg      Is BP treated: No      HDL Cholesterol: 40 mg/dL      Total Cholesterol: 225 mg/dL      Objective   Vital Signs:  /82 (BP Location: Left arm, Patient Position: Sitting) Comment: Pt found a tick on his leg  Pulse 63   Temp 98.1 °F (36.7 °C) (Oral)   Resp 16   Ht 185.4 cm (72.99\")   Wt 120 kg (264 lb)   SpO2 99%   BMI 34.84 kg/m²   Estimated body mass index is 34.84 kg/m² as calculated from the following:    Height as of this encounter: 185.4 cm (72.99\").    Weight as of this encounter: 120 kg (264 lb).            Physical Exam  Vitals and nursing note reviewed.   Constitutional:       Appearance: He is obese. He is not ill-appearing.   Cardiovascular:      Rate and Rhythm: Normal rate.   Pulmonary:      Effort: Pulmonary effort is normal.   Neurological:      Mental Status: He is alert.   Psychiatric:         Mood and Affect: Mood normal.         Behavior: Behavior normal.         Thought Content: Thought content normal.         Judgment: Judgment normal.        Result Review :    Common labs          10/28/2024    12:25 2/28/2025    09:35   Common Labs   Glucose 103  122    BUN 12  14    Creatinine 0.87  0.83    Sodium 141  140    Potassium 4.4  4.2    Chloride 104  104    Calcium 9.2  9.2    Albumin 4.1  4.1    Total Bilirubin 0.5  0.3    Alkaline Phosphatase 84  89    AST (SGOT) 26  23    ALT (SGPT) 25  34    WBC 7.67     Hemoglobin 15.7     Hematocrit 48.3     Platelets 194     Total " Cholesterol  225    Triglycerides  210    HDL Cholesterol  40    LDL Cholesterol   147    Hemoglobin A1C  6.90                Assessment and Plan   Diagnoses and all orders for this visit:    1. Type 2 diabetes mellitus with hyperglycemia, without long-term current use of insulin (Primary)  -     Microalbumin / Creatinine Urine Ratio - Urine, Clean Catch  -     atorvastatin (Lipitor) 20 MG tablet; Take 1 tablet by mouth Daily.  Dispense: 90 tablet; Refill: 2  -     Continuous Glucose Sensor (FreeStyle Ruma 3 Plus Sensor); Use Every 14 (Fourteen) Days.  Dispense: 2 each; Refill: 11  -     Semaglutide,0.25 or 0.5MG/DOS, (Ozempic, 0.25 or 0.5 MG/DOSE,) 2 MG/3ML solution pen-injector; Inject 0.5 mg under the skin into the appropriate area as directed 1 (One) Time Per Week.  Dispense: 3 mL; Refill: 2    2. Gastroesophageal reflux disease with esophagitis without hemorrhage    3. Mixed hyperlipidemia    Initiated atorvastatin  Increase semaglutide  GERD continue Nexium         Follow Up   Return in about 3 months (around 9/9/2025), or if symptoms worsen or fail to improve.  Patient was given instructions and counseling regarding his condition or for health maintenance advice. Please see specific information pulled into the AVS if appropriate.             Answers submitted by the patient for this visit:  Diabetes Questionnaire (Submitted on 6/2/2025)  Chief Complaint: Diabetes problem  Below 70: occasionally

## 2025-06-10 LAB
ALBUMIN/CREAT UR: 10 MG/G CREAT (ref 0–29)
CREAT UR-MCNC: 115.9 MG/DL
MICROALBUMIN UR-MCNC: 11.6 UG/ML

## 2025-06-13 RX ORDER — ESOMEPRAZOLE MAGNESIUM 40 MG/1
40 CAPSULE, DELAYED RELEASE ORAL DAILY
Qty: 90 CAPSULE | Refills: 3 | OUTPATIENT
Start: 2025-06-13

## 2025-07-14 RX ORDER — ESOMEPRAZOLE MAGNESIUM 40 MG/1
40 CAPSULE, DELAYED RELEASE ORAL DAILY
Qty: 90 CAPSULE | Refills: 3 | OUTPATIENT
Start: 2025-07-14

## 2025-07-23 RX ORDER — ESOMEPRAZOLE MAGNESIUM 40 MG/1
40 CAPSULE, DELAYED RELEASE ORAL DAILY
Qty: 90 CAPSULE | Refills: 3 | OUTPATIENT
Start: 2025-07-23

## 2025-07-29 ENCOUNTER — PATIENT MESSAGE (OUTPATIENT)
Dept: INTERNAL MEDICINE | Facility: CLINIC | Age: 50
End: 2025-07-29
Payer: COMMERCIAL

## 2025-07-29 RX ORDER — ESOMEPRAZOLE MAGNESIUM 40 MG/1
40 CAPSULE, DELAYED RELEASE ORAL DAILY
Qty: 90 CAPSULE | Refills: 3 | OUTPATIENT
Start: 2025-07-29

## (undated) DEVICE — SEALANT WND FIBRIN TISSEEL PREFIL/SYR/PRIMAFZ 4ML

## (undated) DEVICE — TOTAL TRAY, 16FR 10ML SIL FOLEY, URN: Brand: MEDLINE

## (undated) DEVICE — SYS BALN KII DISSCT RND C0K12

## (undated) DEVICE — DEV COND GAS LAP INSUFLOW W/LUER CONN

## (undated) DEVICE — APPL HEMOS FOR DELIVERY FLOSEAL

## (undated) DEVICE — JACKSON-PRATT 100CC BULB RESERVOIR: Brand: CARDINAL HEALTH

## (undated) DEVICE — BITEBLOCK OMNI BLOC

## (undated) DEVICE — LAPAROVUE VISIBILITY SYSTEM LAPAROSCOPIC SOLUTIONS: Brand: LAPAROVUE

## (undated) DEVICE — INTENDED FOR TISSUE SEPARATION, AND OTHER PROCEDURES THAT REQUIRE A SHARP SURGICAL BLADE TO PUNCTURE OR CUT.: Brand: BARD-PARKER ® CARBON RIB-BACK BLADES

## (undated) DEVICE — ANTIBACTERIAL UNDYED BRAIDED (POLYGLACTIN 910), SYNTHETIC ABSORBABLE SUTURE: Brand: COATED VICRYL

## (undated) DEVICE — TBG 02 CRUSH RESIST LF CLR 7FT

## (undated) DEVICE — UNDYED BRAIDED (POLYGLACTIN 910), SYNTHETIC ABSORBABLE SUTURE: Brand: COATED VICRYL

## (undated) DEVICE — ENDOPOUCH RETRIEVER SPECIMEN RETRIEVAL BAGS: Brand: ENDOPOUCH RETRIEVER

## (undated) DEVICE — SNAR POLYP CAPTIVATOR RND STFF 2.4 240CM 10MM 1P/U

## (undated) DEVICE — LOU LAP CHOLE: Brand: MEDLINE INDUSTRIES, INC.

## (undated) DEVICE — ADHS SKIN SURG TISS VISC PREMIERPRO EXOFIN HI/VISC FAST/DRY

## (undated) DEVICE — ENDOPATH XCEL BLADELESS TROCARS WITH STABILITY SLEEVES: Brand: ENDOPATH XCEL

## (undated) DEVICE — Device

## (undated) DEVICE — THE CARR-LOCKE INJECTION NEEDLE IS A SINGLE USE, DISPOSABLE, FLEXIBLE SHEATH INJECTION NEEDLE USED FOR THE INJECTION OF VARIOUS TYPES OF MEDIA THROUGH FLEXIBLE ENDOSCOPES.

## (undated) DEVICE — SOL NACL 0.9PCT 1000ML

## (undated) DEVICE — FRCP BX RADJAW4 NDL 2.8 240CM LG OG BX40

## (undated) DEVICE — THE SINGLE USE ETRAP – POLYP TRAP IS USED FOR SUCTION RETRIEVAL OF ENDOSCOPICALLY REMOVED POLYPS.: Brand: ETRAP

## (undated) DEVICE — GLV SURG BIOGEL LTX PF 7

## (undated) DEVICE — ADAPT CLN BIOGUARD AIR/H2O DISP

## (undated) DEVICE — MSK ENDO PORT O2 POM ELITE CURAPLEX A/

## (undated) DEVICE — APPL CHLORAPREP HI/LITE 26ML ORNG

## (undated) DEVICE — 30977 SEE SHARP - ENHANCED INTRAOPERATIVE LAPAROSCOPE CLEANING & DEFOGGING: Brand: 30977 SEE SHARP - ENHANCED INTRAOPERATIVE LAPAROSCOPE CLEANING & DEFOGGING

## (undated) DEVICE — LAPAROSCOPIC SMOKE FILTRATION SYSTEM: Brand: PALL LAPAROSHIELD® PLUS LAPAROSCOPIC SMOKE FILTRATION SYSTEM

## (undated) DEVICE — TUBING, SUCTION, 1/4" X 10', STRAIGHT: Brand: MEDLINE

## (undated) DEVICE — HARMONIC ACE +7 LAPAROSCOPIC SHEARS ADVANCED HEMOSTASIS 5MM DIAMETER 36CM SHAFT LENGTH  FOR USE WITH GRAY HAND PIECE ONLY: Brand: HARMONIC ACE

## (undated) DEVICE — SENSR O2 OXIMAX FNGR A/ 18IN NONSTR

## (undated) DEVICE — LN SMPL CO2 SHTRM SD STREAM W/M LUER

## (undated) DEVICE — KT ORCA ORCAPOD DISP STRL

## (undated) DEVICE — ENDOCUT SCISSOR TIP, DISPOSABLE: Brand: RENEW

## (undated) DEVICE — DISPOSABLE MONOPOLAR ENDOSCOPIC CORD 10 FT. (3M): Brand: KIRWAN

## (undated) DEVICE — Device: Brand: DEFENDO AIR/WATER/SUCTION AND BIOPSY VALVE

## (undated) DEVICE — CANN NASL CO2 TRULINK W/O2 A/

## (undated) DEVICE — CONTAINER,SPECIMEN,OR STERILE,4OZ: Brand: MEDLINE